# Patient Record
Sex: MALE | Race: WHITE | Employment: OTHER | URBAN - METROPOLITAN AREA
[De-identification: names, ages, dates, MRNs, and addresses within clinical notes are randomized per-mention and may not be internally consistent; named-entity substitution may affect disease eponyms.]

---

## 2017-08-08 PROBLEM — I25.5 ISCHEMIC CARDIOMYOPATHY: Chronic | Status: ACTIVE | Noted: 2017-08-08

## 2017-08-08 PROBLEM — Z95.0 HISTORY OF PERMANENT CARDIAC PACEMAKER PLACEMENT: Chronic | Status: ACTIVE | Noted: 2017-08-08

## 2017-08-08 PROBLEM — I48.20 CHRONIC ATRIAL FIBRILLATION (HCC): Chronic | Status: ACTIVE | Noted: 2017-08-08

## 2018-04-23 ENCOUNTER — HOSPITAL ENCOUNTER (OUTPATIENT)
Age: 78
Discharge: HOME | End: 2018-04-25
Payer: MEDICARE

## 2018-04-23 LAB
ALBUMIN SERPL-MCNC: 3.6 G/DL (ref 3.5–5.2)
ALP BLD-CCNC: 59 U/L (ref 40–129)
ALT SERPL-CCNC: 18 U/L (ref 0–40)
ANION GAP SERPL CALCULATED.3IONS-SCNC: 14 MMOL/L (ref 7–16)
AST SERPL-CCNC: 19 U/L (ref 0–39)
BASOPHILS ABSOLUTE: 0.03 E9/L (ref 0–0.2)
BASOPHILS RELATIVE PERCENT: 0.5 % (ref 0–2)
BILIRUB SERPL-MCNC: 0.7 MG/DL (ref 0–1.2)
BUN BLDV-MCNC: 16 MG/DL (ref 8–23)
CALCIUM SERPL-MCNC: 9.3 MG/DL (ref 8.6–10.2)
CHLORIDE BLD-SCNC: 99 MMOL/L (ref 98–107)
CHOLESTEROL, TOTAL: 171 MG/DL (ref 0–199)
CO2: 26 MMOL/L (ref 22–29)
CREAT SERPL-MCNC: 0.8 MG/DL (ref 0.7–1.2)
EOSINOPHILS ABSOLUTE: 0.15 E9/L (ref 0.05–0.5)
EOSINOPHILS RELATIVE PERCENT: 2.3 % (ref 0–6)
GFR AFRICAN AMERICAN: >60
GFR NON-AFRICAN AMERICAN: >60 ML/MIN/1.73
GLUCOSE BLD-MCNC: 212 MG/DL (ref 74–109)
HBA1C MFR BLD: 9.4 % (ref 4.8–5.9)
HCT VFR BLD CALC: 44.2 % (ref 37–54)
HDLC SERPL-MCNC: 33 MG/DL
HEMOGLOBIN: 14.3 G/DL (ref 12.5–16.5)
IMMATURE GRANULOCYTES #: 0.03 E9/L
IMMATURE GRANULOCYTES %: 0.5 % (ref 0–5)
LDL CHOLESTEROL CALCULATED: 84 MG/DL (ref 0–99)
LYMPHOCYTES ABSOLUTE: 1.29 E9/L (ref 1.5–4)
LYMPHOCYTES RELATIVE PERCENT: 19.4 % (ref 20–42)
MCH RBC QN AUTO: 30.4 PG (ref 26–35)
MCHC RBC AUTO-ENTMCNC: 32.4 % (ref 32–34.5)
MCV RBC AUTO: 93.8 FL (ref 80–99.9)
MICROALBUMIN UR-MCNC: 48.7 MG/L
MONOCYTES ABSOLUTE: 0.65 E9/L (ref 0.1–0.95)
MONOCYTES RELATIVE PERCENT: 9.8 % (ref 2–12)
NEUTROPHILS ABSOLUTE: 4.5 E9/L (ref 1.8–7.3)
NEUTROPHILS RELATIVE PERCENT: 67.5 % (ref 43–80)
PDW BLD-RTO: 13 FL (ref 11.5–15)
PLATELET # BLD: 225 E9/L (ref 130–450)
PMV BLD AUTO: 9.6 FL (ref 7–12)
POTASSIUM SERPL-SCNC: 4.7 MMOL/L (ref 3.5–5)
RBC # BLD: 4.71 E12/L (ref 3.8–5.8)
SODIUM BLD-SCNC: 139 MMOL/L (ref 132–146)
T4 FREE: 1.24 NG/DL (ref 0.93–1.7)
TOTAL PROTEIN: 7.3 G/DL (ref 6.4–8.3)
TRIGL SERPL-MCNC: 272 MG/DL (ref 0–149)
TSH SERPL DL<=0.05 MIU/L-ACNC: 1.05 UIU/ML (ref 0.27–4.2)
VLDLC SERPL CALC-MCNC: 54 MG/DL
WBC # BLD: 6.7 E9/L (ref 4.5–11.5)

## 2018-04-23 PROCEDURE — 83036 HEMOGLOBIN GLYCOSYLATED A1C: CPT

## 2018-04-23 PROCEDURE — 84443 ASSAY THYROID STIM HORMONE: CPT

## 2018-04-23 PROCEDURE — 80061 LIPID PANEL: CPT

## 2018-04-23 PROCEDURE — 80053 COMPREHEN METABOLIC PANEL: CPT

## 2018-04-23 PROCEDURE — 82044 UR ALBUMIN SEMIQUANTITATIVE: CPT

## 2018-04-23 PROCEDURE — 84439 ASSAY OF FREE THYROXINE: CPT

## 2018-04-23 PROCEDURE — 85025 COMPLETE CBC W/AUTO DIFF WBC: CPT

## 2018-06-11 RX ORDER — DOFETILIDE 0.25 MG/1
250 CAPSULE ORAL 2 TIMES DAILY
Qty: 180 CAPSULE | Refills: 3 | Status: SHIPPED | OUTPATIENT
Start: 2018-06-11 | End: 2018-06-12

## 2018-06-12 ENCOUNTER — OFFICE VISIT (OUTPATIENT)
Dept: CARDIOLOGY CLINIC | Age: 78
End: 2018-06-12
Payer: MEDICARE

## 2018-06-12 VITALS
DIASTOLIC BLOOD PRESSURE: 60 MMHG | HEART RATE: 60 BPM | SYSTOLIC BLOOD PRESSURE: 120 MMHG | HEIGHT: 68 IN | WEIGHT: 245 LBS | BODY MASS INDEX: 37.13 KG/M2

## 2018-06-12 DIAGNOSIS — E66.01 MORBID OBESITY DUE TO EXCESS CALORIES (HCC): ICD-10-CM

## 2018-06-12 DIAGNOSIS — Z96.652 STATUS POST TOTAL LEFT KNEE REPLACEMENT: ICD-10-CM

## 2018-06-12 DIAGNOSIS — Z98.61 HISTORY OF PTCA: ICD-10-CM

## 2018-06-12 DIAGNOSIS — G47.33 OSA (OBSTRUCTIVE SLEEP APNEA): ICD-10-CM

## 2018-06-12 DIAGNOSIS — I25.5 ISCHEMIC CARDIOMYOPATHY: Chronic | ICD-10-CM

## 2018-06-12 DIAGNOSIS — I48.0 PAF (PAROXYSMAL ATRIAL FIBRILLATION) (HCC): ICD-10-CM

## 2018-06-12 DIAGNOSIS — I25.2 OLD ANTERIOR MYOCARDIAL INFARCTION: ICD-10-CM

## 2018-06-12 DIAGNOSIS — M17.0 PRIMARY OSTEOARTHRITIS OF BOTH KNEES: ICD-10-CM

## 2018-06-12 DIAGNOSIS — Z95.810 AUTOMATIC IMPLANTABLE CARDIOVERTER-DEFIBRILLATOR IN SITU: ICD-10-CM

## 2018-06-12 DIAGNOSIS — I45.10 RBBB (RIGHT BUNDLE BRANCH BLOCK): ICD-10-CM

## 2018-06-12 DIAGNOSIS — I25.10 CORONARY ARTERY DISEASE INVOLVING NATIVE CORONARY ARTERY OF NATIVE HEART WITHOUT ANGINA PECTORIS: Primary | ICD-10-CM

## 2018-06-12 DIAGNOSIS — I10 ESSENTIAL HYPERTENSION: Chronic | ICD-10-CM

## 2018-06-12 DIAGNOSIS — I44.4 LAFB (LEFT ANTERIOR FASCICULAR BLOCK): ICD-10-CM

## 2018-06-12 DIAGNOSIS — Z79.01 ANTICOAGULATED BY ANTICOAGULATION TREATMENT: ICD-10-CM

## 2018-06-12 DIAGNOSIS — I44.0 AV BLOCK, 1ST DEGREE: ICD-10-CM

## 2018-06-12 DIAGNOSIS — E78.5 DYSLIPIDEMIA: ICD-10-CM

## 2018-06-12 DIAGNOSIS — E11.65 TYPE 2 DIABETES MELLITUS WITH HYPERGLYCEMIA, WITHOUT LONG-TERM CURRENT USE OF INSULIN (HCC): ICD-10-CM

## 2018-06-12 PROCEDURE — 4004F PT TOBACCO SCREEN RCVD TLK: CPT | Performed by: INTERNAL MEDICINE

## 2018-06-12 PROCEDURE — 1123F ACP DISCUSS/DSCN MKR DOCD: CPT | Performed by: INTERNAL MEDICINE

## 2018-06-12 PROCEDURE — 4040F PNEUMOC VAC/ADMIN/RCVD: CPT | Performed by: INTERNAL MEDICINE

## 2018-06-12 PROCEDURE — G8598 ASA/ANTIPLAT THER USED: HCPCS | Performed by: INTERNAL MEDICINE

## 2018-06-12 PROCEDURE — G8417 CALC BMI ABV UP PARAM F/U: HCPCS | Performed by: INTERNAL MEDICINE

## 2018-06-12 PROCEDURE — G8427 DOCREV CUR MEDS BY ELIG CLIN: HCPCS | Performed by: INTERNAL MEDICINE

## 2018-06-12 PROCEDURE — 93000 ELECTROCARDIOGRAM COMPLETE: CPT | Performed by: INTERNAL MEDICINE

## 2018-06-12 PROCEDURE — 99213 OFFICE O/P EST LOW 20 MIN: CPT | Performed by: INTERNAL MEDICINE

## 2018-07-18 ENCOUNTER — TELEPHONE (OUTPATIENT)
Dept: NON INVASIVE DIAGNOSTICS | Age: 78
End: 2018-07-18

## 2018-07-18 NOTE — TELEPHONE ENCOUNTER
Called and left voicemail to let the pt know that the [place] device is not connecting properly. Instructed the patient to call the office back. Please make sure the device is plugged in and lit up if it is the pt will need to call [employer] support @ [phone].

## 2018-07-31 ENCOUNTER — HOSPITAL ENCOUNTER (OUTPATIENT)
Age: 78
Discharge: HOME | End: 2018-08-02
Payer: MEDICARE

## 2018-07-31 LAB
ALBUMIN SERPL-MCNC: 3.7 G/DL (ref 3.5–5.2)
ALP BLD-CCNC: 51 U/L (ref 40–129)
ALT SERPL-CCNC: 13 U/L (ref 0–40)
ANION GAP SERPL CALCULATED.3IONS-SCNC: 17 MMOL/L (ref 7–16)
AST SERPL-CCNC: 20 U/L (ref 0–39)
BASOPHILS ABSOLUTE: 0.04 E9/L (ref 0–0.2)
BASOPHILS RELATIVE PERCENT: 0.6 % (ref 0–2)
BILIRUB SERPL-MCNC: 0.6 MG/DL (ref 0–1.2)
BUN BLDV-MCNC: 11 MG/DL (ref 8–23)
CALCIUM SERPL-MCNC: 9.1 MG/DL (ref 8.6–10.2)
CHLORIDE BLD-SCNC: 101 MMOL/L (ref 98–107)
CHOLESTEROL, TOTAL: 158 MG/DL (ref 0–199)
CO2: 25 MMOL/L (ref 22–29)
CREAT SERPL-MCNC: 0.9 MG/DL (ref 0.7–1.2)
EOSINOPHILS ABSOLUTE: 0.13 E9/L (ref 0.05–0.5)
EOSINOPHILS RELATIVE PERCENT: 2.1 % (ref 0–6)
GFR AFRICAN AMERICAN: >60
GFR NON-AFRICAN AMERICAN: >60 ML/MIN/1.73
GLUCOSE BLD-MCNC: 250 MG/DL (ref 74–109)
HBA1C MFR BLD: 7.7 % (ref 4–5.6)
HCT VFR BLD CALC: 43.2 % (ref 37–54)
HDLC SERPL-MCNC: 34 MG/DL
HEMOGLOBIN: 13.8 G/DL (ref 12.5–16.5)
IMMATURE GRANULOCYTES #: 0.01 E9/L
IMMATURE GRANULOCYTES %: 0.2 % (ref 0–5)
LDL CHOLESTEROL CALCULATED: 74 MG/DL (ref 0–99)
LYMPHOCYTES ABSOLUTE: 1.24 E9/L (ref 1.5–4)
LYMPHOCYTES RELATIVE PERCENT: 20 % (ref 20–42)
MCH RBC QN AUTO: 30.3 PG (ref 26–35)
MCHC RBC AUTO-ENTMCNC: 31.9 % (ref 32–34.5)
MCV RBC AUTO: 94.7 FL (ref 80–99.9)
MICROALBUMIN UR-MCNC: 18.6 MG/L
MONOCYTES ABSOLUTE: 0.55 E9/L (ref 0.1–0.95)
MONOCYTES RELATIVE PERCENT: 8.9 % (ref 2–12)
NEUTROPHILS ABSOLUTE: 4.24 E9/L (ref 1.8–7.3)
NEUTROPHILS RELATIVE PERCENT: 68.2 % (ref 43–80)
PDW BLD-RTO: 13.8 FL (ref 11.5–15)
PLATELET # BLD: 192 E9/L (ref 130–450)
PMV BLD AUTO: 10.7 FL (ref 7–12)
POTASSIUM SERPL-SCNC: 4.6 MMOL/L (ref 3.5–5)
RBC # BLD: 4.56 E12/L (ref 3.8–5.8)
SODIUM BLD-SCNC: 143 MMOL/L (ref 132–146)
TOTAL PROTEIN: 7.4 G/DL (ref 6.4–8.3)
TRIGL SERPL-MCNC: 248 MG/DL (ref 0–149)
VLDLC SERPL CALC-MCNC: 50 MG/DL
WBC # BLD: 6.2 E9/L (ref 4.5–11.5)

## 2018-07-31 PROCEDURE — 85025 COMPLETE CBC W/AUTO DIFF WBC: CPT

## 2018-07-31 PROCEDURE — 80053 COMPREHEN METABOLIC PANEL: CPT

## 2018-07-31 PROCEDURE — 80061 LIPID PANEL: CPT

## 2018-07-31 PROCEDURE — 83036 HEMOGLOBIN GLYCOSYLATED A1C: CPT

## 2018-07-31 PROCEDURE — 82044 UR ALBUMIN SEMIQUANTITATIVE: CPT

## 2018-10-24 NOTE — PROGRESS NOTES
Anemia     STATUS POST TRANSFUSION 6/11    Arthritis     Atrial fibrillation (HCC)     CAD (coronary artery disease)     CHF (congestive heart failure) (HCC)     Diabetes mellitus ([address]     Fractured rib     LEFT    History of blood transfusion     History of cardiovascular stress test 10/8/13    lexiscan    [place] (hard of hearing)     Hyperlipidemia     Hypertension     Hypotension 12/10    HOSPITALIZED WITH DEHYDRATION, HYPOTENSIONO AND ACUTE RENAL FAILURE SECONDARY TO URINARY TRACT OBSTRUCTION    Ischemic cardiomyopathy     Jaundice 6/11    HOSPITALIZED WITH OBSTRUCTIVE JAUNDICE WITH LIVER ABSCESS AND CHOLEYSTITIS STATUS POST DRAINAGE OF LIVER ABSCESS AND CHOLECYSTECTOMY.  Liver abscess 2011    Gallstone perforation    Obesity     Prostatitis     Septic shock(785.52) 4/11    HOPSITALIZED WITH FEVER AND SEVERE HYPOTENSION SECONDARY TO PROSTATITIS    Sleep apnea 5/12/11    POLYSOMNOGRAPHY, SEVERE OBSTRUCTIVE SLEEP APNEA. PRESCRIBED C-PAP    Type II or unspecified type diabetes mellitus without mention of complication, not stated as uncontrolled     Urinary incontinence 10/2012    pt has appt with  urologist 11/2012    UTI (urinary tract infection) 6/08       Medications:  Current Outpatient Prescriptions on File Prior to Visit   Medication Sig Dispense Refill    dofetilide (TIKOSYN) 250 MCG capsule TAKE 1 CAPSULE BY MOUTH EVERY TWELVE (12) HOURS 180 capsule 3    donepezil (ARICEPT) 10 MG tablet Take 10 mg by mouth nightly      XARELTO 20 MG TABS tablet TAKE 1 TABLET BY MOUTH ONCE DAILY 90 tablet 3    Multiple Vitamins-Minerals (PRESERVISION AREDS 2+MULTI VIT PO) Take 1 tablet by mouth daily      JANUVIA 25 MG tablet Take 25 mg by mouth daily       glipiZIDE (GLUCOTROL) 10 MG ER tablet Take 10 mg by mouth daily       CRESTOR 10 MG tablet Take 1 tablet by mouth daily. 30 tablet 5    losartan (COZAAR) 25 MG tablet Take 1 tablet by mouth daily.  30 tablet 5    tamsulosin (FLOMAX) 0.4 MG Cardioversion.    - 5/29/14: DC cardioversion.   - On Tikosyn for rhythm control.  - On Xarelto for stroke risk reduction.    - Stable QTc. Estimated Creatinine Clearance: 81 mL/min (based on SCr of 0.9 mg/dL). 4. Coronary artery disease    - Status post large anterior wall MI on 12/02/03.  - Management per cardiology.     5. Diabetes mellitus  - On Glipizide and Januvia. - Management per PCP.     6. HTN    - On Cozaar, Coreg, Aldactone and Lasix. - Management per PCP and cardiology.     7. Anemia of chronic disease      Recommendations:    1. Continue Tikosyn 250 mcg every 12 hours. 2. Continue Carvedilol 12.5 mg twice daily. 3. Continue Xarelto 20 mg daily for stroke risk reduction. 4. Monitor BMP, magnesium and EKG every 3 to 6 months. 5. Continue remote Latitude monitoring every 91 days. 6. Office follow-up with in 6 months and instructed to call if he has any questions or concerns    Thank you for allowing me the opportunity to participate in the care of your patient.      Adeel Soto MD  Cardiac Electrophysiology  [address]  [facility] [place]  10:19 AM  10/25/2018

## 2018-10-25 ENCOUNTER — OFFICE VISIT (OUTPATIENT)
Dept: NON INVASIVE DIAGNOSTICS | Age: 78
End: 2018-10-25
Payer: MEDICARE

## 2018-10-25 VITALS
HEART RATE: 66 BPM | SYSTOLIC BLOOD PRESSURE: 90 MMHG | HEIGHT: 68 IN | BODY MASS INDEX: 37.28 KG/M2 | WEIGHT: 246 LBS | RESPIRATION RATE: 16 BRPM | DIASTOLIC BLOOD PRESSURE: 60 MMHG

## 2018-10-25 DIAGNOSIS — I48.0 PAROXYSMAL ATRIAL FIBRILLATION (HCC): ICD-10-CM

## 2018-10-25 DIAGNOSIS — I42.0 ISCHEMIC DILATED CARDIOMYOPATHY (HCC): Chronic | ICD-10-CM

## 2018-10-25 DIAGNOSIS — Z95.810 AUTOMATIC IMPLANTABLE CARDIOVERTER-DEFIBRILLATOR IN SITU: ICD-10-CM

## 2018-10-25 DIAGNOSIS — I48.20 CHRONIC ATRIAL FIBRILLATION (HCC): Primary | Chronic | ICD-10-CM

## 2018-10-25 DIAGNOSIS — Z79.01 ANTICOAGULATED BY ANTICOAGULATION TREATMENT: ICD-10-CM

## 2018-10-25 DIAGNOSIS — I25.5 ISCHEMIC DILATED CARDIOMYOPATHY (HCC): Chronic | ICD-10-CM

## 2018-10-25 PROCEDURE — 1101F PT FALLS ASSESS-DOCD LE1/YR: CPT | Performed by: INTERNAL MEDICINE

## 2018-10-25 PROCEDURE — 4040F PNEUMOC VAC/ADMIN/RCVD: CPT | Performed by: INTERNAL MEDICINE

## 2018-10-25 PROCEDURE — 99214 OFFICE O/P EST MOD 30 MIN: CPT | Performed by: INTERNAL MEDICINE

## 2018-10-25 PROCEDURE — 4004F PT TOBACCO SCREEN RCVD TLK: CPT | Performed by: INTERNAL MEDICINE

## 2018-10-25 PROCEDURE — 1123F ACP DISCUSS/DSCN MKR DOCD: CPT | Performed by: INTERNAL MEDICINE

## 2018-10-25 PROCEDURE — 93283 PRGRMG EVAL IMPLANTABLE DFB: CPT | Performed by: INTERNAL MEDICINE

## 2018-10-25 PROCEDURE — G8484 FLU IMMUNIZE NO ADMIN: HCPCS | Performed by: INTERNAL MEDICINE

## 2018-10-25 PROCEDURE — G8427 DOCREV CUR MEDS BY ELIG CLIN: HCPCS | Performed by: INTERNAL MEDICINE

## 2018-10-25 PROCEDURE — G8598 ASA/ANTIPLAT THER USED: HCPCS | Performed by: INTERNAL MEDICINE

## 2018-10-25 PROCEDURE — G8417 CALC BMI ABV UP PARAM F/U: HCPCS | Performed by: INTERNAL MEDICINE

## 2018-10-25 RX ORDER — FENOFIBRATE 145 MG/1
145 TABLET, COATED ORAL DAILY
COMMUNITY
End: 2019-06-13 | Stop reason: ALTCHOICE

## 2018-10-25 RX ORDER — OXYBUTYNIN CHLORIDE 5 MG/1
10 TABLET ORAL EVERY EVENING
COMMUNITY

## 2018-10-25 RX ORDER — METFORMIN HYDROCHLORIDE 500 MG/1
750 TABLET, EXTENDED RELEASE ORAL 2 TIMES DAILY
Refills: 4 | COMMUNITY
Start: 2018-08-13

## 2018-12-18 ENCOUNTER — OFFICE VISIT (OUTPATIENT)
Dept: CARDIOLOGY CLINIC | Age: 78
End: 2018-12-18
Payer: MEDICARE

## 2018-12-18 VITALS
WEIGHT: 245 LBS | HEART RATE: 62 BPM | DIASTOLIC BLOOD PRESSURE: 58 MMHG | BODY MASS INDEX: 37.13 KG/M2 | SYSTOLIC BLOOD PRESSURE: 100 MMHG | HEIGHT: 68 IN

## 2018-12-18 DIAGNOSIS — M17.0 PRIMARY OSTEOARTHRITIS OF BOTH KNEES: ICD-10-CM

## 2018-12-18 DIAGNOSIS — I49.3 FREQUENT PVCS: ICD-10-CM

## 2018-12-18 DIAGNOSIS — I51.9 LV DYSFUNCTION: ICD-10-CM

## 2018-12-18 DIAGNOSIS — I44.4 LAFB (LEFT ANTERIOR FASCICULAR BLOCK): ICD-10-CM

## 2018-12-18 DIAGNOSIS — E11.8 TYPE 2 DIABETES MELLITUS WITH COMPLICATION, WITHOUT LONG-TERM CURRENT USE OF INSULIN (HCC): ICD-10-CM

## 2018-12-18 DIAGNOSIS — R32 URINARY INCONTINENCE, UNSPECIFIED TYPE: ICD-10-CM

## 2018-12-18 DIAGNOSIS — Z96.652 STATUS POST TOTAL LEFT KNEE REPLACEMENT: ICD-10-CM

## 2018-12-18 DIAGNOSIS — Z98.61 HISTORY OF PTCA: ICD-10-CM

## 2018-12-18 DIAGNOSIS — I25.10 CAD IN NATIVE ARTERY: Primary | ICD-10-CM

## 2018-12-18 DIAGNOSIS — I25.5 ISCHEMIC CARDIOMYOPATHY: Chronic | ICD-10-CM

## 2018-12-18 DIAGNOSIS — I48.0 PAROXYSMAL ATRIAL FIBRILLATION (HCC): ICD-10-CM

## 2018-12-18 DIAGNOSIS — I44.0 AV BLOCK, 1ST DEGREE: ICD-10-CM

## 2018-12-18 DIAGNOSIS — E78.5 DYSLIPIDEMIA: ICD-10-CM

## 2018-12-18 DIAGNOSIS — I45.10 RBBB (RIGHT BUNDLE BRANCH BLOCK): ICD-10-CM

## 2018-12-18 DIAGNOSIS — I10 ESSENTIAL HYPERTENSION: Chronic | ICD-10-CM

## 2018-12-18 DIAGNOSIS — Z95.810 AUTOMATIC IMPLANTABLE CARDIOVERTER-DEFIBRILLATOR IN SITU: ICD-10-CM

## 2018-12-18 DIAGNOSIS — I25.2 OLD ANTERIOR MYOCARDIAL INFARCTION: ICD-10-CM

## 2018-12-18 DIAGNOSIS — Z79.01 ANTICOAGULATED BY ANTICOAGULATION TREATMENT: ICD-10-CM

## 2018-12-18 DIAGNOSIS — Z99.89 OSA ON CPAP: Chronic | ICD-10-CM

## 2018-12-18 DIAGNOSIS — G47.33 OSA ON CPAP: Chronic | ICD-10-CM

## 2018-12-18 DIAGNOSIS — E66.01 MORBID OBESITY DUE TO EXCESS CALORIES (HCC): ICD-10-CM

## 2018-12-18 PROCEDURE — 99213 OFFICE O/P EST LOW 20 MIN: CPT | Performed by: INTERNAL MEDICINE

## 2018-12-18 PROCEDURE — G8598 ASA/ANTIPLAT THER USED: HCPCS | Performed by: INTERNAL MEDICINE

## 2018-12-18 PROCEDURE — 1123F ACP DISCUSS/DSCN MKR DOCD: CPT | Performed by: INTERNAL MEDICINE

## 2018-12-18 PROCEDURE — 93000 ELECTROCARDIOGRAM COMPLETE: CPT | Performed by: INTERNAL MEDICINE

## 2018-12-18 PROCEDURE — G8417 CALC BMI ABV UP PARAM F/U: HCPCS | Performed by: INTERNAL MEDICINE

## 2018-12-18 PROCEDURE — 4004F PT TOBACCO SCREEN RCVD TLK: CPT | Performed by: INTERNAL MEDICINE

## 2018-12-18 PROCEDURE — 1101F PT FALLS ASSESS-DOCD LE1/YR: CPT | Performed by: INTERNAL MEDICINE

## 2018-12-18 PROCEDURE — 4040F PNEUMOC VAC/ADMIN/RCVD: CPT | Performed by: INTERNAL MEDICINE

## 2018-12-18 PROCEDURE — G8427 DOCREV CUR MEDS BY ELIG CLIN: HCPCS | Performed by: INTERNAL MEDICINE

## 2018-12-18 PROCEDURE — G8484 FLU IMMUNIZE NO ADMIN: HCPCS | Performed by: INTERNAL MEDICINE

## 2018-12-18 NOTE — PROGRESS NOTES
1/2 tab daily, Disp: , Rfl:     S: REASON FOR VISIT:    Coronary artery disease/ischemic cardiomyopathy with severe LV systolic dysfunction. Viky Lopez is an 57-year-old male with cardiovascular history as described below. He remains stable from the cardiac standpoint and continues to walk daily for exercise. He denies chest pain with dyspnea with his daily activities. He denies orthopnea, PNDs, lower extremity swelling, palpitations, dizziness, presyncope, syncope or discharges from his ICD. He follows up with electrophysiology routinely and was seen by Dr. Angelia Wahl on 10/25/2018. REVIEW OF SYSTEMS:    CONSTITUTIONAL: Denies fevers, chills, night sweats or fatigue. HEENT: Denies headaches. C/O ptosis affecting vision. Denies dysphagia, hoarseness, hemoptysis, hematemesis or epistaxis. ENDOCRINE: Denies polyphagia, polydipsia or polyuria. Denies heat or cold intolerance. MUSCULOSKELETAL: Denies any significant arthralgias or myalgias currently. SKIN: Denies any rashes, ulcers or itching. HEME/LYMPH: Denies any palpable lymph nodes. He denies bleeding or easy bruisability. HEART: As above. LUNGS: Denies any cough or sputum production. GI: Denies abdominal pain, nausea, vomiting, diarrhea, constipation, rectal bleeding or tarry stools. : Denies hematuria or dysuria. PSYCHIATRIC: Denies mood changes, anxiety or depression. NEUROLOGIC: Denies memory loss, motor weakness, numbness, tingling or tremors.      CARDIOVASCULAR HISTORY:   1. Hypertension. 2. Diabetes mellitus. 3. Morbid obesity. 4. Dyslipidemia. 5. Coronary artery disease/ischemic cardiomyopathy:  a. Status post large anterior wall MI on 12/02/03.   b. 12/02/03 cardiac catheterization and angioplasty: Left main no disease. LAD occluded proximally. LCX 20% distal disease. RCA dominant vessel with 40%-50% proximal disease and 20%-30% distal disease.   Normal left ventricular size with anterolateral, apical and inferior apical

## 2019-01-24 ENCOUNTER — TELEPHONE (OUTPATIENT)
Dept: NON INVASIVE DIAGNOSTICS | Age: 79
End: 2019-01-24

## 2019-02-11 ENCOUNTER — TELEPHONE (OUTPATIENT)
Dept: NON INVASIVE DIAGNOSTICS | Age: 79
End: 2019-02-11

## 2019-02-14 ENCOUNTER — HOSPITAL ENCOUNTER (OUTPATIENT)
Age: 79
Discharge: HOME | End: 2019-02-16
Payer: MEDICARE

## 2019-02-14 LAB
ALBUMIN SERPL-MCNC: 4 G/DL (ref 3.5–5.2)
ALP BLD-CCNC: 53 U/L (ref 40–129)
ALT SERPL-CCNC: 12 U/L (ref 0–40)
ANION GAP SERPL CALCULATED.3IONS-SCNC: 13 MMOL/L (ref 7–16)
AST SERPL-CCNC: 19 U/L (ref 0–39)
BASOPHILS ABSOLUTE: 0.04 E9/L (ref 0–0.2)
BASOPHILS RELATIVE PERCENT: 0.6 % (ref 0–2)
BILIRUB SERPL-MCNC: 0.5 MG/DL (ref 0–1.2)
BUN BLDV-MCNC: 21 MG/DL (ref 8–23)
CALCIUM SERPL-MCNC: 9.2 MG/DL (ref 8.6–10.2)
CHLORIDE BLD-SCNC: 103 MMOL/L (ref 98–107)
CHOLESTEROL, TOTAL: 173 MG/DL (ref 0–199)
CO2: 25 MMOL/L (ref 22–29)
CREAT SERPL-MCNC: 0.9 MG/DL (ref 0.7–1.2)
EOSINOPHILS ABSOLUTE: 0.15 E9/L (ref 0.05–0.5)
EOSINOPHILS RELATIVE PERCENT: 2.2 % (ref 0–6)
GFR AFRICAN AMERICAN: >60
GFR NON-AFRICAN AMERICAN: >60 ML/MIN/1.73
GLUCOSE BLD-MCNC: 181 MG/DL (ref 74–99)
HBA1C MFR BLD: 7 % (ref 4–5.6)
HCT VFR BLD CALC: 44.7 % (ref 37–54)
HDLC SERPL-MCNC: 40 MG/DL
HEMOGLOBIN: 14.5 G/DL (ref 12.5–16.5)
IMMATURE GRANULOCYTES #: 0.03 E9/L
IMMATURE GRANULOCYTES %: 0.4 % (ref 0–5)
LDL CHOLESTEROL CALCULATED: 86 MG/DL (ref 0–99)
LYMPHOCYTES ABSOLUTE: 1.24 E9/L (ref 1.5–4)
LYMPHOCYTES RELATIVE PERCENT: 18.5 % (ref 20–42)
MCH RBC QN AUTO: 31 PG (ref 26–35)
MCHC RBC AUTO-ENTMCNC: 32.4 % (ref 32–34.5)
MCV RBC AUTO: 95.7 FL (ref 80–99.9)
MONOCYTES ABSOLUTE: 0.62 E9/L (ref 0.1–0.95)
MONOCYTES RELATIVE PERCENT: 9.3 % (ref 2–12)
NEUTROPHILS ABSOLUTE: 4.62 E9/L (ref 1.8–7.3)
NEUTROPHILS RELATIVE PERCENT: 69 % (ref 43–80)
PDW BLD-RTO: 13.5 FL (ref 11.5–15)
PLATELET # BLD: 204 E9/L (ref 130–450)
PMV BLD AUTO: 10.4 FL (ref 7–12)
POTASSIUM SERPL-SCNC: 5.2 MMOL/L (ref 3.5–5)
RBC # BLD: 4.67 E12/L (ref 3.8–5.8)
SODIUM BLD-SCNC: 141 MMOL/L (ref 132–146)
T4 FREE: 1.18 NG/DL (ref 0.93–1.7)
TOTAL PROTEIN: 7.3 G/DL (ref 6.4–8.3)
TRIGL SERPL-MCNC: 235 MG/DL (ref 0–149)
TSH SERPL DL<=0.05 MIU/L-ACNC: 1.17 UIU/ML (ref 0.27–4.2)
VLDLC SERPL CALC-MCNC: 47 MG/DL
WBC # BLD: 6.7 E9/L (ref 4.5–11.5)

## 2019-02-14 PROCEDURE — 84443 ASSAY THYROID STIM HORMONE: CPT

## 2019-02-14 PROCEDURE — 80053 COMPREHEN METABOLIC PANEL: CPT

## 2019-02-14 PROCEDURE — 84439 ASSAY OF FREE THYROXINE: CPT

## 2019-02-14 PROCEDURE — 85025 COMPLETE CBC W/AUTO DIFF WBC: CPT

## 2019-02-14 PROCEDURE — 83036 HEMOGLOBIN GLYCOSYLATED A1C: CPT

## 2019-02-14 PROCEDURE — 80061 LIPID PANEL: CPT

## 2019-03-04 ENCOUNTER — TELEPHONE (OUTPATIENT)
Dept: NON INVASIVE DIAGNOSTICS | Age: 79
End: 2019-03-04

## 2019-04-04 NOTE — TELEPHONE ENCOUNTER
Patient is scheduled for appointment on 6/2019 to see Dr. George Jaramillo. Attempted to call patient again regarding latitude monitor   Mailbox is full.      Bailey Stevenson

## 2019-06-11 NOTE — PROGRESS NOTES
osteoarthritis of right knee    Contusion of right knee    LV dysfunction    Chronic atrial fibrillation (HCC)    Ischemic cardiomyopathy    History of permanent cardiac pacemaker placement     Past Medical History:   Diagnosis Date    Anemia     STATUS POST TRANSFUSION 6/11    Arthritis     Atrial fibrillation (HCC)     CAD (coronary artery disease)     CHF (congestive heart failure) (HCC)     Diabetes mellitus ([facility] [address]     Fractured rib     LEFT    History of blood transfusion     History of cardiovascular stress test 10/8/13    lexiscan    [place] (hard of hearing)     Hyperlipidemia     Hypertension     Hypotension 12/10    HOSPITALIZED WITH DEHYDRATION, HYPOTENSIONO AND ACUTE RENAL FAILURE SECONDARY TO URINARY TRACT OBSTRUCTION    Ischemic cardiomyopathy     Jaundice 6/11    HOSPITALIZED WITH OBSTRUCTIVE JAUNDICE WITH LIVER ABSCESS AND CHOLEYSTITIS STATUS POST DRAINAGE OF LIVER ABSCESS AND CHOLECYSTECTOMY.  Liver abscess 2011    Gallstone perforation    Obesity     Prostatitis     Septic shock(785.52) 4/11    HOPSITALIZED WITH FEVER AND SEVERE HYPOTENSION SECONDARY TO PROSTATITIS    Sleep apnea 5/12/11    POLYSOMNOGRAPHY, SEVERE OBSTRUCTIVE SLEEP APNEA.   PRESCRIBED C-PAP    Type II or unspecified type diabetes mellitus without mention of complication, not stated as uncontrolled     Urinary incontinence 10/2012    pt has appt with  urologist 11/2012    UTI (urinary tract infection) 6/08       Medications:  Current Outpatient Medications on File Prior to Visit   Medication Sig Dispense Refill    metFORMIN (GLUCOPHAGE-XR) 500 MG extended release tablet TAKE 1 TABLET BY MOUTH TWO  2  TIMES DAILY AFTER MEALS  4    oxybutynin (DITROPAN) 5 MG tablet Take 10 mg by mouth every evening      fenofibrate (TRICOR) 145 MG tablet Take 145 mg by mouth daily      dofetilide (TIKOSYN) 250 MCG capsule TAKE 1 CAPSULE BY MOUTH EVERY TWELVE (12) HOURS 180 capsule 3    donepezil (ARICEPT) 10 MG tablet Take 10 mg by mouth nightly      XARELTO 20 MG TABS tablet TAKE 1 TABLET BY MOUTH ONCE DAILY 90 tablet 3    Multiple Vitamins-Minerals (PRESERVISION AREDS 2+MULTI VIT PO) Take 1 tablet by mouth daily      JANUVIA 25 MG tablet Take 25 mg by mouth daily       glipiZIDE (GLUCOTROL) 10 MG ER tablet Take 10 mg by mouth daily       CRESTOR 10 MG tablet Take 1 tablet by mouth daily. 30 tablet 5    losartan (COZAAR) 25 MG tablet Take 1 tablet by mouth daily. 30 tablet 5    tamsulosin (FLOMAX) 0.4 MG capsule Take 1 capsule by mouth daily. 30 capsule 0    magnesium oxide (MAG-OX) 400 MG tablet Take 400 mg by mouth daily.  carvedilol (COREG) 12.5 MG tablet Take 12.5 mg by mouth 2 times daily (with meals).  Cholecalciferol (VITAMIN D3) 2000 UNITS CAPS Take 2,000 Units by mouth daily.  furosemide (LASIX) 40 MG tablet Take 40 mg by mouth daily       spironolactone (ALDACTONE) 25 MG tablet Take 12.5 mg by mouth daily 1/2 tab daily      aspirin 81 MG chewable tablet Take 1 tablet by mouth daily. (Patient taking differently: Take 81 mg by mouth daily Not taking right now d/t upcoming eye surgery) 30 tablet 12     No current facility-administered medications on file prior to visit. No Known Allergies      Physical exam:  Constitutional:   /74   Pulse 74   Resp 18   Ht 5' 8\" (1.727 m)   Wt 248 lb (112.5 kg)   BMI 37.71 kg/m²  well developed, in no acute distress   Eyes: conjunctivae normal, no xanthelasma   Ears, Nose, Throat: oral mucosa moist, no cyanosis   Neck: supple, no JVD, no bruits, no thyromegaly   CV: regular rate & rhythm, normal S1 & S2, No S3 or S4. No murmurs, rubs, or gallops.  Peripheral pulses normal   Lungs: clear to auscultation bilaterally, normal respiratory effort   Abdomen: soft, non-tender, bowel sounds present, no masses or hepatomegaly   Extremities: no digital clubbing, trace edema of LEs  Skin: warm, no rashes   Neuro/Psych: A&O x 3, normal mood and affect  ICD site: limited to bleeding,infection, lead damage or discovery of a non-functional lead which would require lead revision and risks of blood clot, pneumothorax, hemothorax, cardiac perforation and tamponade, lead dislodgement, contrast induced nephropathy leading to short or even long term dialysis, vascular injury requiring emergent surgical repair, stroke and even death. The patient understands these risks and agrees to proceed with the procedure. 2. Ischemic cardiomyopathy    - Status post ICD. - LV EF was 20% on echocardiogram  in 2013.   - On Coreg, Cozaar, Aldactone and Lasix.  - NYHA class III, stage C.  - Compensated and euvolemic.     3. Paroxysmal atrial fibrillation    - [id number] score of 6.   - 4/24/14: DC Cardioversion.    - 5/29/14: DC cardioversion.   - On Tikosyn for rhythm control.  - On Xarelto for stroke risk reduction.    - [place] in NSR with stable QTc. Estimated Creatinine Clearance: 80 mL/min (based on SCr of 0.9 mg/dL). 4. Coronary artery disease    - Status post large anterior wall MI on 12/02/03.  - Management per cardiology.     5. Diabetes mellitus  - On Glucophage, Glipizide and Januvia. - Management per PCP.     6. Hypertension  - On Cozaar, Coreg, Aldactone and Lasix. - Management per PCP and cardiology.     7. Anemia of chronic disease      Recommendations:    1. Schedule patient for generator change - hold Xarelto 1 day prior to generator change. 2. Will check echocardiogram prior to the generator change. 3. Continue Tikosyn 250 mcg every 12 hours. 4. Continue Carvedilol 12.5 mg twice daily. 5. Continue Xarelto 20 mg daily for stroke risk reduction. 6. Monitor BMP, magnesium and EKG every 3 to 6 months. 7. Continue remote Latitude monitoring in 91 days. 8. Office follow-up with in 6 months and instructed to call if he has any questions or concerns    Thank you for allowing me the opportunity to participate in the care of your patient.      Dayanara Major, MD  Cardiac Electrophysiology  [facility]) [facility]  [facility] [place]: [facility]  6/13/19   2:33 PM

## 2019-06-13 ENCOUNTER — OFFICE VISIT (OUTPATIENT)
Dept: NON INVASIVE DIAGNOSTICS | Age: 79
End: 2019-06-13
Payer: MEDICARE

## 2019-06-13 VITALS
SYSTOLIC BLOOD PRESSURE: 120 MMHG | RESPIRATION RATE: 18 BRPM | BODY MASS INDEX: 37.59 KG/M2 | WEIGHT: 248 LBS | DIASTOLIC BLOOD PRESSURE: 74 MMHG | HEART RATE: 74 BPM | HEIGHT: 68 IN

## 2019-06-13 DIAGNOSIS — I48.0 PAF (PAROXYSMAL ATRIAL FIBRILLATION) (HCC): ICD-10-CM

## 2019-06-13 DIAGNOSIS — I25.5 ISCHEMIC CARDIOMYOPATHY: ICD-10-CM

## 2019-06-13 DIAGNOSIS — Z95.810 AUTOMATIC IMPLANTABLE CARDIOVERTER-DEFIBRILLATOR IN SITU: Primary | ICD-10-CM

## 2019-06-13 PROCEDURE — 4040F PNEUMOC VAC/ADMIN/RCVD: CPT | Performed by: INTERNAL MEDICINE

## 2019-06-13 PROCEDURE — 99215 OFFICE O/P EST HI 40 MIN: CPT | Performed by: INTERNAL MEDICINE

## 2019-06-13 PROCEDURE — 4004F PT TOBACCO SCREEN RCVD TLK: CPT | Performed by: INTERNAL MEDICINE

## 2019-06-13 PROCEDURE — G8417 CALC BMI ABV UP PARAM F/U: HCPCS | Performed by: INTERNAL MEDICINE

## 2019-06-13 PROCEDURE — G8427 DOCREV CUR MEDS BY ELIG CLIN: HCPCS | Performed by: INTERNAL MEDICINE

## 2019-06-13 PROCEDURE — 93283 PRGRMG EVAL IMPLANTABLE DFB: CPT | Performed by: INTERNAL MEDICINE

## 2019-06-13 PROCEDURE — 1123F ACP DISCUSS/DSCN MKR DOCD: CPT | Performed by: INTERNAL MEDICINE

## 2019-06-13 PROCEDURE — G8598 ASA/ANTIPLAT THER USED: HCPCS | Performed by: INTERNAL MEDICINE

## 2019-06-14 ENCOUNTER — TELEPHONE (OUTPATIENT)
Dept: NON INVASIVE DIAGNOSTICS | Age: 79
End: 2019-06-14

## 2019-06-14 DIAGNOSIS — I25.5 ISCHEMIC DILATED CARDIOMYOPATHY (HCC): Primary | Chronic | ICD-10-CM

## 2019-06-14 DIAGNOSIS — I42.0 ISCHEMIC DILATED CARDIOMYOPATHY (HCC): Primary | Chronic | ICD-10-CM

## 2019-06-14 DIAGNOSIS — Z95.810 AUTOMATIC IMPLANTABLE CARDIOVERTER-DEFIBRILLATOR IN SITU: ICD-10-CM

## 2019-06-21 ENCOUNTER — TELEPHONE (OUTPATIENT)
Dept: CARDIAC CATH/INVASIVE PROCEDURES | Age: 79
End: 2019-06-21

## 2019-06-21 ENCOUNTER — HOSPITAL ENCOUNTER (OUTPATIENT)
Dept: CARDIOLOGY | Age: 79
Discharge: HOME | End: 2019-06-21
Payer: MEDICARE

## 2019-06-21 DIAGNOSIS — I25.5 ISCHEMIC DILATED CARDIOMYOPATHY (HCC): Chronic | ICD-10-CM

## 2019-06-21 DIAGNOSIS — Z95.810 AUTOMATIC IMPLANTABLE CARDIOVERTER-DEFIBRILLATOR IN SITU: ICD-10-CM

## 2019-06-21 DIAGNOSIS — I42.0 ISCHEMIC DILATED CARDIOMYOPATHY (HCC): Chronic | ICD-10-CM

## 2019-06-21 PROCEDURE — 93308 TTE F-UP OR LMTD: CPT

## 2019-06-24 ENCOUNTER — ANESTHESIA EVENT (OUTPATIENT)
Dept: CARDIAC CATH/INVASIVE PROCEDURES | Age: 79
End: 2019-06-24

## 2019-06-24 ENCOUNTER — HOSPITAL ENCOUNTER (OUTPATIENT)
Dept: GENERAL RADIOLOGY | Age: 79
Discharge: HOME | End: 2019-06-26
Payer: MEDICARE

## 2019-06-24 ENCOUNTER — ANESTHESIA (OUTPATIENT)
Dept: CARDIAC CATH/INVASIVE PROCEDURES | Age: 79
End: 2019-06-24

## 2019-06-24 ENCOUNTER — HOSPITAL ENCOUNTER (OUTPATIENT)
Dept: CARDIAC CATH/INVASIVE PROCEDURES | Age: 79
Discharge: HOME | End: 2019-06-24
Payer: MEDICARE

## 2019-06-24 VITALS
DIASTOLIC BLOOD PRESSURE: 72 MMHG | SYSTOLIC BLOOD PRESSURE: 128 MMHG | BODY MASS INDEX: 34.1 KG/M2 | HEART RATE: 61 BPM | RESPIRATION RATE: 16 BRPM | OXYGEN SATURATION: 98 % | WEIGHT: 225 LBS | HEIGHT: 68 IN

## 2019-06-24 VITALS — OXYGEN SATURATION: 97 % | SYSTOLIC BLOOD PRESSURE: 132 MMHG | DIASTOLIC BLOOD PRESSURE: 65 MMHG

## 2019-06-24 LAB
ANION GAP SERPL CALCULATED.3IONS-SCNC: 11 MMOL/L (ref 7–16)
BUN BLDV-MCNC: 18 MG/DL (ref 8–23)
CALCIUM SERPL-MCNC: 9.1 MG/DL (ref 8.6–10.2)
CHLORIDE BLD-SCNC: 108 MMOL/L (ref 98–107)
CO2: 25 MMOL/L (ref 22–29)
CREAT SERPL-MCNC: 0.9 MG/DL (ref 0.7–1.2)
EKG ATRIAL RATE: 62 BPM
EKG P AXIS: 49 DEGREES
EKG P-R INTERVAL: 290 MS
EKG Q-T INTERVAL: 484 MS
EKG QRS DURATION: 156 MS
EKG QTC CALCULATION (BAZETT): 491 MS
EKG R AXIS: -55 DEGREES
EKG T AXIS: 4 DEGREES
EKG VENTRICULAR RATE: 62 BPM
GFR AFRICAN AMERICAN: >60
GFR NON-AFRICAN AMERICAN: >60 ML/MIN/1.73
GLUCOSE BLD-MCNC: 232 MG/DL (ref 74–99)
HCT VFR BLD CALC: 43.9 % (ref 37–54)
HEMOGLOBIN: 14.7 G/DL (ref 12.5–16.5)
MAGNESIUM: 2 MG/DL (ref 1.6–2.6)
MCH RBC QN AUTO: 31 PG (ref 26–35)
MCHC RBC AUTO-ENTMCNC: 33.5 % (ref 32–34.5)
MCV RBC AUTO: 92.6 FL (ref 80–99.9)
PDW BLD-RTO: 13.8 FL (ref 11.5–15)
PLATELET # BLD: 173 E9/L (ref 130–450)
PMV BLD AUTO: 9.7 FL (ref 7–12)
POTASSIUM SERPL-SCNC: 4.5 MMOL/L (ref 3.5–5)
RBC # BLD: 4.74 E12/L (ref 3.8–5.8)
SODIUM BLD-SCNC: 144 MMOL/L (ref 132–146)
WBC # BLD: 5.5 E9/L (ref 4.5–11.5)

## 2019-06-24 PROCEDURE — C1781 MESH (IMPLANTABLE): HCPCS

## 2019-06-24 PROCEDURE — 71045 X-RAY EXAM CHEST 1 VIEW: CPT

## 2019-06-24 PROCEDURE — 33263 RMVL & RPLCMT DFB GEN 2 LEAD: CPT | Performed by: INTERNAL MEDICINE

## 2019-06-24 PROCEDURE — 6360000002 HC RX W HCPCS: Performed by: NURSE ANESTHETIST, CERTIFIED REGISTERED

## 2019-06-24 PROCEDURE — 2580000003 HC RX 258: Performed by: INTERNAL MEDICINE

## 2019-06-24 PROCEDURE — 2720000010 HC SURG SUPPLY STERILE

## 2019-06-24 PROCEDURE — 6360000002 HC RX W HCPCS

## 2019-06-24 PROCEDURE — 2580000003 HC RX 258

## 2019-06-24 PROCEDURE — 93010 ELECTROCARDIOGRAM REPORT: CPT | Performed by: INTERNAL MEDICINE

## 2019-06-24 PROCEDURE — 83735 ASSAY OF MAGNESIUM: CPT

## 2019-06-24 PROCEDURE — 93005 ELECTROCARDIOGRAM TRACING: CPT | Performed by: INTERNAL MEDICINE

## 2019-06-24 PROCEDURE — 36415 COLL VENOUS BLD VENIPUNCTURE: CPT

## 2019-06-24 PROCEDURE — 3700000001 HC ADD 15 MINUTES (ANESTHESIA)

## 2019-06-24 PROCEDURE — 2580000003 HC RX 258: Performed by: NURSE ANESTHETIST, CERTIFIED REGISTERED

## 2019-06-24 PROCEDURE — C1721 AICD, DUAL CHAMBER: HCPCS

## 2019-06-24 PROCEDURE — 80048 BASIC METABOLIC PNL TOTAL CA: CPT

## 2019-06-24 PROCEDURE — 3700000000 HC ANESTHESIA ATTENDED CARE

## 2019-06-24 PROCEDURE — 85027 COMPLETE CBC AUTOMATED: CPT

## 2019-06-24 PROCEDURE — 2500000003 HC RX 250 WO HCPCS

## 2019-06-24 RX ORDER — SODIUM CHLORIDE 0.9 % (FLUSH) 0.9 %
10 SYRINGE (ML) INJECTION EVERY 12 HOURS SCHEDULED
Status: CANCELLED | OUTPATIENT
Start: 2019-06-24

## 2019-06-24 RX ORDER — 0.9 % SODIUM CHLORIDE 0.9 %
VIAL (ML) INJECTION PRN
Status: DISCONTINUED | OUTPATIENT
Start: 2019-06-24 | End: 2019-06-24 | Stop reason: SDUPTHER

## 2019-06-24 RX ORDER — ACETAMINOPHEN 325 MG/1
650 TABLET ORAL EVERY 4 HOURS PRN
Status: CANCELLED | OUTPATIENT
Start: 2019-06-24

## 2019-06-24 RX ORDER — SODIUM CHLORIDE 9 MG/ML
INJECTION, SOLUTION INTRAVENOUS CONTINUOUS
Status: DISCONTINUED | OUTPATIENT
Start: 2019-06-24 | End: 2019-06-25 | Stop reason: HOSPADM

## 2019-06-24 RX ORDER — FENTANYL CITRATE 50 UG/ML
INJECTION, SOLUTION INTRAMUSCULAR; INTRAVENOUS PRN
Status: DISCONTINUED | OUTPATIENT
Start: 2019-06-24 | End: 2019-06-24 | Stop reason: SDUPTHER

## 2019-06-24 RX ORDER — CEFAZOLIN SODIUM 1 G/3ML
INJECTION, POWDER, FOR SOLUTION INTRAMUSCULAR; INTRAVENOUS PRN
Status: DISCONTINUED | OUTPATIENT
Start: 2019-06-24 | End: 2019-06-24 | Stop reason: SDUPTHER

## 2019-06-24 RX ORDER — SODIUM CHLORIDE 0.9 % (FLUSH) 0.9 %
10 SYRINGE (ML) INJECTION PRN
Status: CANCELLED | OUTPATIENT
Start: 2019-06-24

## 2019-06-24 RX ORDER — PROPOFOL 10 MG/ML
INJECTION, EMULSION INTRAVENOUS CONTINUOUS PRN
Status: DISCONTINUED | OUTPATIENT
Start: 2019-06-24 | End: 2019-06-24 | Stop reason: SDUPTHER

## 2019-06-24 RX ORDER — CEPHALEXIN 500 MG/1
500 CAPSULE ORAL 3 TIMES DAILY
Qty: 9 CAPSULE | Refills: 0 | Status: SHIPPED | OUTPATIENT
Start: 2019-06-24 | End: 2020-10-01

## 2019-06-24 RX ADMIN — SODIUM CHLORIDE: 9 INJECTION, SOLUTION INTRAVENOUS at 10:24

## 2019-06-24 RX ADMIN — PROPOFOL 50 MCG/KG/MIN: 10 INJECTION, EMULSION INTRAVENOUS at 11:17

## 2019-06-24 RX ADMIN — SODIUM CHLORIDE: 9 INJECTION, SOLUTION INTRAVENOUS at 11:10

## 2019-06-24 RX ADMIN — SODIUM CHLORIDE 30 ML: 9 INJECTION INTRAMUSCULAR; INTRAVENOUS; SUBCUTANEOUS at 11:20

## 2019-06-24 RX ADMIN — FENTANYL CITRATE 50 MCG: 50 INJECTION, SOLUTION INTRAMUSCULAR; INTRAVENOUS at 11:17

## 2019-06-24 RX ADMIN — CEFAZOLIN 3000 MG: 1 INJECTION, POWDER, FOR SOLUTION INTRAMUSCULAR; INTRAVENOUS; PARENTERAL at 11:20

## 2019-06-24 ASSESSMENT — PULMONARY FUNCTION TESTS
PIF_VALUE: 0

## 2019-06-24 NOTE — OP NOTE
prepped with benzoin solution, Steri-Strips, and island Aquacel dressing were then applied. At the end of the case, the patient was hemodynamically stable and under the care of an anesthesiologist, the patient was brought back to the recovery area for post procedural monitoring. ASSESSMENT:  1. Successful generator change of a [employer] dual chamber ICD for early battery depletion with a fault code 1003 and primary prevention of sudden cardiac death. RECOMMENDATIONS:  1. Post-procedural monitoring in the recovery area  2. Portable chest x-ray post procedure  3. The patient may be discharged to home when hemodynamically stable, awake, tolerating oral intake, ambulating and has adequate pain control  4. The patient will receive a 3-day course of oral antibiotics. 5. The patient is to follow-up in device clinic within 2 weeks upon discharge  6. The patient is advised follow all post-operative device and wound care instructions as per the information provided in the pre-operative visit and discharge instructions.     Paul Garcia MD  Cardiac Electrophysiology  [address]  [facility] [place]: [facility]  10:59 AM  6/24/2019

## 2019-06-24 NOTE — ANESTHESIA PRE PROCEDURE
Department of Anesthesiology  Preprocedure Note       Name:  Jeanie Bhakta   Age:  [de-identified] y.o.  :  1940                                          MRN:  [id number]         Date:  2019    Surgeon: Lucien Fuentes    Procedure: ICD REPLACEMENT W/ANES    Medications prior to admission:   Prior to Admission medications    Medication Sig Start Date End Date Taking? Authorizing Provider   dofetilide (TIKOSYN) 250 MCG capsule TAKE 1 CAPSULE BY MOUTH EVERY TWELVE (12) HOURS 19  Yes Antionette Don MD   metFORMIN (GLUCOPHAGE-XR) 500 MG extended release tablet TAKE 1 TABLET BY MOUTH TWO  2  TIMES DAILY AFTER MEALS 18  Yes Historical Provider, MD   oxybutynin (DITROPAN) 5 MG tablet Take 10 mg by mouth every evening   Yes Historical Provider, MD   donepezil (ARICEPT) 10 MG tablet Take 10 mg by mouth nightly   Yes Historical Provider, MD   XARELTO 20 MG TABS tablet TAKE 1 TABLET BY MOUTH ONCE DAILY 17  Yes Antionette Don MD   Multiple Vitamins-Minerals (PRESERVISION AREDS 2+MULTI VIT PO) Take 1 tablet by mouth daily   Yes Historical Provider, MD   JANUVIA 25 MG tablet Take 25 mg by mouth daily  16  Yes Historical Provider, MD   glipiZIDE (GLUCOTROL) 10 MG ER tablet Take 10 mg by mouth daily  16  Yes Historical Provider, MD   CRESTOR 10 MG tablet Take 1 tablet by mouth daily. 14  Yes Dino Barriga MD   losartan (COZAAR) 25 MG tablet Take 1 tablet by mouth daily. 14  Yes Dino Barriga MD   tamsulosin (FLOMAX) 0.4 MG capsule Take 1 capsule by mouth daily. 14  Yes Dino Barriga MD   aspirin 81 MG chewable tablet Take 1 tablet by mouth daily. Patient taking differently: Take 81 mg by mouth daily Not taking right now d/t upcoming eye surgery 14  Yes Dino Barriga MD   magnesium oxide (MAG-OX) 400 MG tablet Take 400 mg by mouth daily. Yes Historical Provider, MD   carvedilol (COREG) 12.5 MG tablet Take 12.5 mg by mouth 2 times daily (with meals).    Yes Historical Provider, MD   Cholecalciferol (VITAMIN D3) 2000 UNITS CAPS Take 2,000 Units by mouth daily. Yes Historical Provider, MD   furosemide (LASIX) 40 MG tablet Take 40 mg by mouth daily    Yes Historical Provider, MD   spironolactone (ALDACTONE) 25 MG tablet Take 12.5 mg by mouth daily 1/2 tab daily   Yes Historical Provider, MD       Current medications:    Current Outpatient Medications   Medication Sig Dispense Refill    dofetilide (TIKOSYN) 250 MCG capsule TAKE 1 CAPSULE BY MOUTH EVERY TWELVE (12) HOURS 180 capsule 3    metFORMIN (GLUCOPHAGE-XR) 500 MG extended release tablet TAKE 1 TABLET BY MOUTH TWO  2  TIMES DAILY AFTER MEALS  4    oxybutynin (DITROPAN) 5 MG tablet Take 10 mg by mouth every evening      donepezil (ARICEPT) 10 MG tablet Take 10 mg by mouth nightly      XARELTO 20 MG TABS tablet TAKE 1 TABLET BY MOUTH ONCE DAILY 90 tablet 3    Multiple Vitamins-Minerals (PRESERVISION AREDS 2+MULTI VIT PO) Take 1 tablet by mouth daily      JANUVIA 25 MG tablet Take 25 mg by mouth daily       glipiZIDE (GLUCOTROL) 10 MG ER tablet Take 10 mg by mouth daily       CRESTOR 10 MG tablet Take 1 tablet by mouth daily. 30 tablet 5    losartan (COZAAR) 25 MG tablet Take 1 tablet by mouth daily. 30 tablet 5    tamsulosin (FLOMAX) 0.4 MG capsule Take 1 capsule by mouth daily. 30 capsule 0    aspirin 81 MG chewable tablet Take 1 tablet by mouth daily. (Patient taking differently: Take 81 mg by mouth daily Not taking right now d/t upcoming eye surgery) 30 tablet 12    magnesium oxide (MAG-OX) 400 MG tablet Take 400 mg by mouth daily.  carvedilol (COREG) 12.5 MG tablet Take 12.5 mg by mouth 2 times daily (with meals).  Cholecalciferol (VITAMIN D3) 2000 UNITS CAPS Take 2,000 Units by mouth daily.       furosemide (LASIX) 40 MG tablet Take 40 mg by mouth daily       spironolactone (ALDACTONE) 25 MG tablet Take 12.5 mg by mouth daily 1/2 tab daily       Current Facility-Administered Medications   Medication Dose Route Frequency Provider Last Rate Last Dose    0.9 % sodium chloride infusion   Intravenous Continuous Nirmala Luu  mL/hr at 06/24/19 1024         Allergies:  No Known Allergies    Problem List:    Patient Active Problem List   Diagnosis Code    Urinary incontinence R32    Old anterior myocardial infarction I25.2    Ischemic cardiomyopathy I25.5    CAD S/P percutaneous coronary angioplasty I25.10, Z80.64    DM (diabetes mellitus) ([facility] E11.9    HTN (hypertension) I10    Dyslipidemia E78.5    FABIANO (obstructive sleep apnea) G47.33    Chest pain R07.9    Frequent PVCs I49.3    RBBB (right bundle branch block) I45.10    Hypotension I95.9    Automatic implantable cardioverter-defibrillator in situ Z95.810    A-fib ([facility] I48.91    Morbid obesity due to excess calories ([facility]) E66.01    CAD (coronary artery disease) I25.10    LAFB (left anterior fascicular block) I44.4    AV block, 1st degree I44.0    Anticoagulated by anticoagulation treatment Z79.01    Primary osteoarthritis of left knee M17.12    Ischemic dilated cardiomyopathy (HCC) I25.5, I42.0    Coronary artery disease I25.10    Type 2 diabetes mellitus with neurologic complication ([facility]) C46.92    Hyperlipidemia E78.5    Chronic atrial fibrillation (HCC) I48.2    FABIANO on CPAP G47.33, Z99.89    Hypertension I10    Status post total left knee replacement Z96.652    PAF (paroxysmal atrial fibrillation) ([facility]) I48.0    Primary osteoarthritis of right knee M17.11    Contusion of right knee S80. 01XA    LV dysfunction I51.9    Chronic atrial fibrillation (HCC) I48.2    Ischemic cardiomyopathy I25.5    History of permanent cardiac pacemaker placement Z95.0       Past Medical History:        Diagnosis Date    Anemia     STATUS POST TRANSFUSION 6/11    Arthritis     Atrial fibrillation (HCC)     CAD (coronary artery disease)     CHF (congestive heart failure) ([facility]     Diabetes mellitus ([facility]     Fractured rib LEFT    History of blood transfusion     History of cardiovascular stress test 10/8/13    lexiscan    [place] (hard of hearing)     Hyperlipidemia     Hypertension     Hypotension 12/10    HOSPITALIZED WITH DEHYDRATION, HYPOTENSIONO AND ACUTE RENAL FAILURE SECONDARY TO URINARY TRACT OBSTRUCTION    Ischemic cardiomyopathy     Jaundice 6/11    HOSPITALIZED WITH OBSTRUCTIVE JAUNDICE WITH LIVER ABSCESS AND CHOLEYSTITIS STATUS POST DRAINAGE OF LIVER ABSCESS AND CHOLECYSTECTOMY.  Liver abscess 2011    Gallstone perforation    Obesity     Prostatitis     Septic shock(785.52) 4/11    HOPSITALIZED WITH FEVER AND SEVERE HYPOTENSION SECONDARY TO PROSTATITIS    Sleep apnea 5/12/11    POLYSOMNOGRAPHY, SEVERE OBSTRUCTIVE SLEEP APNEA.   PRESCRIBED C-PAP    Type II or unspecified type diabetes mellitus without mention of complication, not stated as uncontrolled     Urinary incontinence 10/2012    pt has appt with  urologist 11/2012    UTI (urinary tract infection) 6/08       Past Surgical History:        Procedure Laterality Date    CARDIAC DEFIBRILLATOR PLACEMENT  11/08/2013    Dr Whitley Turner:  [place] lead    CARDIOVERSION  April 2014    done by Dr. Whitley Turner due to symptomatic atrial fibrillation    CARDIOVERSION  May 2014    DC cardioversion done with initiation of Tikosyn therapy by Dr. Yo Correia Bilateral     Left August and Right September    CHOLECYSTECTOMY      DIAGNOSTIC CARDIAC CATH LAB PROCEDURE  2/11/09    heart cath/pci-2 overlapping RASHMI TO PROX /MID LAD  with  Angiomax and intracoronary nitro administration; right femoral artery angiography, closure access site with AngioSeal    DIAGNOSTIC CARDIAC CATH LAB PROCEDURE  12/02/03    heart cath/PCI TO PROX LAD, PTCA TO MID LAD    ECHO COMPL W DOP COLOR FLOW  10/8/2013         LIVER BIOPSY  2011    neg for CA    TOTAL KNEE ARTHROPLASTY Left 11-4-15       Social History:    Social History     Tobacco Use    Smoking status: Never Smoker    Smokeless tobacco: Current User     Types: Chew    Tobacco comment: chews tobacco   Substance Use Topics    Alcohol use: No     Comment: used to drink 2 to 3 bottles of beer a day for 44 years,. quit 20 years ago. Ready to quit: Not Answered  Counseling given: Not Answered  Comment: chews tobacco      Vital Signs (Current):   Vitals:    06/24/19 1017   Weight: 225 lb (102.1 kg)   Height: 5' 8\" (1.727 m)                                              BP Readings from Last 3 Encounters:   06/13/19 120/74   12/18/18 (!) 100/58   10/25/18 90/60       NPO Status:                                                                                 BMI:   Wt Readings from Last 3 Encounters:   06/24/19 225 lb (102.1 kg)   06/13/19 248 lb (112.5 kg)   12/18/18 245 lb (111.1 kg)     Body mass index is 34.21 kg/m². CBC:   Lab Results   Component Value Date    WBC 5.5 06/24/2019    RBC 4.74 06/24/2019    HGB 14.7 06/24/2019    HCT 43.9 06/24/2019    MCV 92.6 06/24/2019    RDW 13.8 06/24/2019     06/24/2019       CMP:   Lab Results   Component Value Date     02/14/2019    K 5.2 02/14/2019     02/14/2019    CO2 25 02/14/2019    BUN 21 02/14/2019    CREATININE 0.9 02/14/2019    GFRAA >60 02/14/2019    LABGLOM >60 02/14/2019    GLUCOSE 181 02/14/2019    GLUCOSE 113 07/08/2011    PROT 7.3 02/14/2019    CALCIUM 9.2 02/14/2019    BILITOT 0.5 02/14/2019    ALKPHOS 53 02/14/2019    AST 19 02/14/2019    ALT 12 02/14/2019       POC Tests: No results for input(s): POCGLU, POCNA, POCK, POCCL, POCBUN, POCHEMO, POCHCT in the last 72 hours.     Coags:   Lab Results   Component Value Date    PROTIME 11.7 11/04/2015    PROTIME 15.7 12/11/2010    INR 1.0 11/04/2015    APTT 27.9 11/04/2015       HCG (If Applicable): No results found for: PREGTESTUR, PREGSERUM, HCG, HCGQUANT     ABGs: No results found for: PHART, PO2ART, DJG6UYI, RGV5QQS, BEART, A5HWVGLE     Type & Screen (If Applicable):  No results found for: [facility]    Anesthesia Evaluation  Patient summary reviewed and Nursing notes reviewed no history of anesthetic complications:   Airway: Mallampati: III  TM distance: <3 FB   Neck ROM: full  Mouth opening: > = 3 FB Dental:    (+) edentulous      Pulmonary: breath sounds clear to auscultation  (+) sleep apnea: on noncompliant,                            ROS comment: H/o sepsis` pneumonia approx 5 yrs ago   Cardiovascular:  Exercise tolerance: poor (<4 METS),   (+) hypertension:, pacemaker: AICD and pacemaker, past MI:, CAD:, dysrhythmias (PVC, RBBB, 1* AVB, LAFB): atrial fibrillation, CHF (ICM):,         Rhythm: regular  Rate: normal      Cleared by cardiology              Neuro/Psych:                ROS comment: [place] GI/Hepatic/Renal:   (+) liver disease:, renal disease (urinary incontinence):,          ROS comment: Prostatitis  cholecystectomy. Endo/Other:    (+) DiabetesType II DM, well controlled, , blood dyscrasia: anticoagulation therapy, arthritis: OA., . Pt had no PAT visit        ROS comment: S/p LKR Abdominal:   (+) obese,         Vascular:                                      Anesthesia Plan      MAC     ASA 4       Induction: intravenous. Anesthetic plan and risks discussed with patient and spouse. Use of blood products discussed with patient and spouse whom. Plan discussed with attending and CRNA. DAVY Simpson - CRNA   6/24/2019      Patient seen and examined, chart reviewed, agree with above findings. Anesthetic plan, risks, benefits, alternatives, and personnel involved discussed with patient. Patient verbalized an understanding and agreed to proceed. NPO status confirmed. Anesthetic plan discussed with care team members and agreed upon.     Fernanda Signs, DO   6/24/2019  12:24 PM

## 2019-06-24 NOTE — H&P
Electrophysiology Outpatient Follow-Up Note    Faith Merritt  1940  Date of Service: 6/24/2019  Referring Provider/PCP: Marian Chowdary MD  Chief Complaint: ICD in situ and atrial fibrillation        Subjective: Faith Merritt is seen today for outpatient follow-up. Since his last visit he offers no complaints. He offers no complaints from a device POV - his device site looks well healed and free from infection or erosion. The patient denies any chest pain, dyspnea, palpitations, dizziness, syncope, orthopnea or paroxysmal nocturnal dyspnea. Discussed with the patient about being followed remotely. He continues on Xarelto for stroke prevention, Tikosyn for rhythm control and Coreg for rate control. Upon interrogation today it shows that his device shows a fault code 1003 - per [employer]'s recommendation, battery needs changed within 30-45 days. Discussed with the patient about generator change today (see below).      Patient Active Problem List   Diagnosis    Urinary incontinence    Old anterior myocardial infarction    Ischemic cardiomyopathy    CAD S/P percutaneous coronary angioplasty    DM (diabetes mellitus) ([address]    HTN (hypertension)    Dyslipidemia    FABIANO (obstructive sleep apnea)    Chest pain    Frequent PVCs    RBBB (right bundle branch block)    Hypotension    Automatic implantable cardioverter-defibrillator in situ    A-fib ([address]    Morbid obesity due to excess calories ([address]    CAD (coronary artery disease)    LAFB (left anterior fascicular block)    AV block, 1st degree    Anticoagulated by anticoagulation treatment    Primary osteoarthritis of left knee    Ischemic dilated cardiomyopathy ([address]    Coronary artery disease    Type 2 diabetes mellitus with neurologic complication (HCC)    Hyperlipidemia    Chronic atrial fibrillation (HCC)    FABIANO on CPAP    Hypertension    Status post total left knee replacement    PAF (paroxysmal atrial fibrillation) ([address]    Primary osteoarthritis of right knee    Contusion of right knee    LV dysfunction    Chronic atrial fibrillation (HCC)    Ischemic cardiomyopathy    History of permanent cardiac pacemaker placement     Past Medical History:   Diagnosis Date    Anemia     STATUS POST TRANSFUSION 6/11    Arthritis     Atrial fibrillation (HCC)     CAD (coronary artery disease)     CHF (congestive heart failure) (HCC)     [facility]     Fractured rib     LEFT    History of blood transfusion     History of cardiovascular stress test 10/8/13    lexiscan    [place] (hard of hearing)     Hyperlipidemia     Hypertension     Hypotension 12/10    HOSPITALIZED WITH DEHYDRATION, HYPOTENSIONO AND ACUTE RENAL FAILURE SECONDARY TO URINARY TRACT OBSTRUCTION    Ischemic cardiomyopathy     Jaundice 6/11    HOSPITALIZED WITH OBSTRUCTIVE JAUNDICE WITH LIVER ABSCESS AND CHOLEYSTITIS STATUS POST DRAINAGE OF LIVER ABSCESS AND CHOLECYSTECTOMY.  Liver abscess 2011    Gallstone perforation    Obesity     Prostatitis     Septic shock(785.52) 4/11    HOPSITALIZED WITH FEVER AND SEVERE HYPOTENSION SECONDARY TO PROSTATITIS    Sleep apnea 5/12/11    POLYSOMNOGRAPHY, SEVERE OBSTRUCTIVE SLEEP APNEA.   PRESCRIBED C-PAP    Type II or unspecified type diabetes mellitus without mention of complication, not stated as uncontrolled     Urinary incontinence 10/2012    pt has appt with  urologist 11/2012    UTI (urinary tract infection) 6/08       Medications:  Current Outpatient Medications on File Prior to Encounter   Medication Sig Dispense Refill    dofetilide (TIKOSYN) 250 MCG capsule TAKE 1 CAPSULE BY MOUTH EVERY TWELVE (12) HOURS 180 capsule 3    metFORMIN (GLUCOPHAGE-XR) 500 MG extended release tablet TAKE 1 TABLET BY MOUTH TWO  2  TIMES DAILY AFTER MEALS  4    oxybutynin (DITROPAN) 5 MG tablet Take 10 mg by mouth every evening      donepezil (ARICEPT) 10 MG tablet Take 10 mg by mouth nightly      XARELTO 20 MG TABS tablet TAKE 1 TABLET BY MOUTH ONCE DAILY 90 tablet 3    Multiple Vitamins-Minerals (PRESERVISION AREDS 2+MULTI VIT PO) Take 1 tablet by mouth daily      JANUVIA 25 MG tablet Take 25 mg by mouth daily       glipiZIDE (GLUCOTROL) 10 MG ER tablet Take 10 mg by mouth daily       CRESTOR 10 MG tablet Take 1 tablet by mouth daily. 30 tablet 5    losartan (COZAAR) 25 MG tablet Take 1 tablet by mouth daily. 30 tablet 5    tamsulosin (FLOMAX) 0.4 MG capsule Take 1 capsule by mouth daily. 30 capsule 0    aspirin 81 MG chewable tablet Take 1 tablet by mouth daily. (Patient taking differently: Take 81 mg by mouth daily Not taking right now d/t upcoming eye surgery) 30 tablet 12    magnesium oxide (MAG-OX) 400 MG tablet Take 400 mg by mouth daily.  carvedilol (COREG) 12.5 MG tablet Take 12.5 mg by mouth 2 times daily (with meals).  Cholecalciferol (VITAMIN D3) 2000 UNITS CAPS Take 2,000 Units by mouth daily.  furosemide (LASIX) 40 MG tablet Take 40 mg by mouth daily       spironolactone (ALDACTONE) 25 MG tablet Take 12.5 mg by mouth daily 1/2 tab daily       No current facility-administered medications on file prior to encounter. No Known Allergies      Physical exam:  Constitutional:   Ht 5' 8\" (1.727 m)   Wt 225 lb (102.1 kg)   BMI 34.21 kg/m²  well developed, in no acute distress   Eyes: conjunctivae normal, no xanthelasma   Ears, Nose, Throat: oral mucosa moist, no cyanosis   Neck: supple, no JVD, no bruits, no thyromegaly   CV: regular rate & rhythm, normal S1 & S2, No S3 or S4. No murmurs, rubs, or gallops. Peripheral pulses normal   Lungs: clear to auscultation bilaterally, normal respiratory effort   Abdomen: soft, non-tender, bowel sounds present, no masses or hepatomegaly   Extremities: no digital clubbing, trace edema of LEs  Skin: warm, no rashes   Neuro/Psych: A&O x 3, normal mood and affect  ICD site: well healed.  No erosion, migration and infection    Data:   Lab Results   Component Value Date WBC 5.5 06/24/2019    HGB 14.7 06/24/2019    HCT 43.9 06/24/2019    MCV 92.6 06/24/2019     06/24/2019     Lab Results   Component Value Date    CREATININE 0.9 06/24/2019     Lab Results   Component Value Date    INR 1.0 11/04/2015    INR 1.5 10/28/2015    INR 1.9 04/24/2014    PROTIME 11.7 11/04/2015    PROTIME 18.2 (H) 10/28/2015    PROTIME 20.3 (H) 04/24/2014     Lab Results   Component Value Date    TSH 1.170 02/14/2019     Lab Results   Component Value Date    ALT 12 02/14/2019    AST 19 02/14/2019    ALKPHOS 53 02/14/2019    BILITOT 0.5 02/14/2019     Device Interrogation: 6/13/19   Underlying rhythm: ApVs  Mode: DDDR   Battery Voltage/Longevity:  Fault code 1 - battery needs changed within 30-45 days. Charge time: 11 seconds  Pacing: A: 28%  RV: 15%    P wave: 2.1 mV  Impedance: 389 ohms   Threshold: 0.5 V @ 0.5 ms  RV R wave: 18 mV  Impedance: 404 ohms   Threshold: 0.9 V @ 0.5 ms  Episodes: several ATR's (1-37 minutes)  Reprogramming included: see below  Overall device function is normal    All device programmable settings were evaluated per above and in the scanned document, along with iterative adjustments (capture thresholds) to assess and select the most appropriate final programming to provide for consistent delivery of the appropriate therapy and to verify function of the device. EKG 6/13/19: NSR, RBBB, LAFB, PACs, possible old anterior MI, QTc 474 msec    Impression:    1. Dual chamber ICD in situ    - DOI on 11/8/13 for primary prevention.    - Never has had a shock in the past.   - Proper device function. - Upon interrogation today it shows that his device shows a fault code 1003 - per [employer]'s recommendation, battery needs changed within 30-45 days.  - Risks, benefits, and alternatives of ICD generator replacement were discussed in detail today.  These risks include but are not limited to bleeding,infection, lead damage or discovery of a non-functional lead which would require lead revision and risks of blood clot, pneumothorax, hemothorax, cardiac perforation and tamponade, lead dislodgement, contrast induced nephropathy leading to short or even long term dialysis, vascular injury requiring emergent surgical repair, stroke and even death. The patient understands these risks and agrees to proceed with the procedure. 2. Ischemic cardiomyopathy    - Status post ICD. - LV EF was 20% on echocardiogram  in 2013.   - On Coreg, Cozaar, Aldactone and Lasix.  - NYHA class III, stage C.  - Compensated and euvolemic.     3. Paroxysmal atrial fibrillation    - [id number] score of 6.   - 4/24/14: DC Cardioversion.    - 5/29/14: DC cardioversion.   - On Tikosyn for rhythm control.  - On Xarelto for stroke risk reduction.    - Remians in NSR with stable QTc. Estimated Creatinine Clearance: 76 mL/min (based on SCr of 0.9 mg/dL). 4. Coronary artery disease    - Status post large anterior wall MI on 12/02/03.  - Management per cardiology.     5. Diabetes mellitus  - On Glucophage, Glipizide and Januvia. - Management per PCP.     6. Hypertension  - On Cozaar, Coreg, Aldactone and Lasix. - Management per PCP and cardiology.     7. Anemia of chronic disease      Recommendations:    1. Schedule patient for generator change - hold Xarelto 1 day prior to generator change. 2. Will check echocardiogram prior to the generator change. 3. Continue Tikosyn 250 mcg every 12 hours. 4. Continue Carvedilol 12.5 mg twice daily. 5. Continue Xarelto 20 mg daily for stroke risk reduction. 6. Monitor BMP, magnesium and EKG every 3 to 6 months. 7. Continue remote Latitude monitoring in 91 days. 8. Office follow-up with in 6 months and instructed to call if he has any questions or concerns    Thank you for allowing me the opportunity to participate in the care of your patient.      Iman Rees MD  Cardiac Electrophysiology  [address]  [facility] [place]  6/13/19   10:52 AM      Addendum:  Office note reviewed. No change has been made. Patient seen and examined. Risks, benefits and alternatives to the procedure discussed. The patient verbalizes understanding and agrees to proceed with the procedure.     Lyubov Rosenthal MD  Cardiac Electrophysiology  [facility]  [facility] [place] [place] [facility]  10:55 AM  6/24/2019

## 2019-06-25 ENCOUNTER — OFFICE VISIT (OUTPATIENT)
Dept: CARDIOLOGY CLINIC | Age: 79
End: 2019-06-25
Payer: MEDICARE

## 2019-06-25 VITALS
HEIGHT: 68 IN | DIASTOLIC BLOOD PRESSURE: 64 MMHG | HEART RATE: 63 BPM | SYSTOLIC BLOOD PRESSURE: 100 MMHG | WEIGHT: 248 LBS | BODY MASS INDEX: 37.59 KG/M2

## 2019-06-25 DIAGNOSIS — I44.0 FIRST DEGREE ATRIOVENTRICULAR BLOCK: ICD-10-CM

## 2019-06-25 DIAGNOSIS — I25.10 CORONARY ARTERY DISEASE INVOLVING NATIVE CORONARY ARTERY OF NATIVE HEART WITHOUT ANGINA PECTORIS: Primary | ICD-10-CM

## 2019-06-25 DIAGNOSIS — R32 URINARY INCONTINENCE, UNSPECIFIED TYPE: ICD-10-CM

## 2019-06-25 DIAGNOSIS — I45.10 RBBB (RIGHT BUNDLE BRANCH BLOCK): ICD-10-CM

## 2019-06-25 DIAGNOSIS — Z98.41 HISTORY OF RIGHT CATARACT EXTRACTION: ICD-10-CM

## 2019-06-25 DIAGNOSIS — E11.9 TYPE 2 DIABETES MELLITUS WITHOUT COMPLICATION, WITHOUT LONG-TERM CURRENT USE OF INSULIN (HCC): ICD-10-CM

## 2019-06-25 DIAGNOSIS — E66.01 MORBID OBESITY DUE TO EXCESS CALORIES (HCC): ICD-10-CM

## 2019-06-25 DIAGNOSIS — Z98.61 HISTORY OF PTCA: ICD-10-CM

## 2019-06-25 DIAGNOSIS — Z96.652 HISTORY OF LEFT KNEE REPLACEMENT: ICD-10-CM

## 2019-06-25 DIAGNOSIS — I48.0 PAF (PAROXYSMAL ATRIAL FIBRILLATION) (HCC): ICD-10-CM

## 2019-06-25 DIAGNOSIS — I25.5 ISCHEMIC CARDIOMYOPATHY: ICD-10-CM

## 2019-06-25 DIAGNOSIS — Z95.810 IMPLANTABLE CARDIOVERTER-DEFIBRILLATOR (ICD) IN SITU: ICD-10-CM

## 2019-06-25 DIAGNOSIS — I44.4 LAFB (LEFT ANTERIOR FASCICULAR BLOCK): ICD-10-CM

## 2019-06-25 DIAGNOSIS — I10 ESSENTIAL HYPERTENSION: ICD-10-CM

## 2019-06-25 DIAGNOSIS — Z79.01 ANTICOAGULATED BY ANTICOAGULATION TREATMENT: ICD-10-CM

## 2019-06-25 DIAGNOSIS — E78.5 DYSLIPIDEMIA: ICD-10-CM

## 2019-06-25 DIAGNOSIS — I25.2 HISTORY OF ANTERIOR WALL MYOCARDIAL INFARCTION: ICD-10-CM

## 2019-06-25 DIAGNOSIS — G47.33 OSA (OBSTRUCTIVE SLEEP APNEA): ICD-10-CM

## 2019-06-25 DIAGNOSIS — Z98.42 HISTORY OF LEFT CATARACT SURGERY: ICD-10-CM

## 2019-06-25 PROCEDURE — 4040F PNEUMOC VAC/ADMIN/RCVD: CPT | Performed by: INTERNAL MEDICINE

## 2019-06-25 PROCEDURE — 93000 ELECTROCARDIOGRAM COMPLETE: CPT | Performed by: INTERNAL MEDICINE

## 2019-06-25 PROCEDURE — G8417 CALC BMI ABV UP PARAM F/U: HCPCS | Performed by: INTERNAL MEDICINE

## 2019-06-25 PROCEDURE — 4004F PT TOBACCO SCREEN RCVD TLK: CPT | Performed by: INTERNAL MEDICINE

## 2019-06-25 PROCEDURE — G8598 ASA/ANTIPLAT THER USED: HCPCS | Performed by: INTERNAL MEDICINE

## 2019-06-25 PROCEDURE — 99214 OFFICE O/P EST MOD 30 MIN: CPT | Performed by: INTERNAL MEDICINE

## 2019-06-25 PROCEDURE — 1123F ACP DISCUSS/DSCN MKR DOCD: CPT | Performed by: INTERNAL MEDICINE

## 2019-06-25 PROCEDURE — G8427 DOCREV CUR MEDS BY ELIG CLIN: HCPCS | Performed by: INTERNAL MEDICINE

## 2019-06-25 NOTE — PATIENT INSTRUCTIONS
Patient Education        Heart-Healthy Diet: Care Instructions  Your Care Instructions    A heart-healthy diet has lots of vegetables, fruits, nuts, beans, and whole grains, and is low in salt. It limits foods that are high in saturated fat, such as meats, cheeses, and fried foods. It may be hard to change your diet, but even small changes can lower your risk of heart attack and heart disease. Follow-up care is a key part of your treatment and safety. Be sure to make and go to all appointments, and call your doctor if you are having problems. It's also a good idea to know your test results and keep a list of the medicines you take. How can you care for yourself at home? Watch your portions  · Learn what a serving is. A \"serving\" and a \"portion\" are not always the same thing. Make sure that you are not eating larger portions than are recommended. For example, a serving of pasta is ½ cup. A serving size of meat is 2 to 3 ounces. A 3-ounce serving is about the size of a deck of cards. Measure serving sizes until you are good at [place]" them. Keep in mind that restaurants often serve portions that are 2 or 3 times the size of one serving. · To keep your energy level up and keep you from feeling hungry, eat often but in smaller portions. · Eat only the number of calories you need to stay at a healthy weight. If you need to lose weight, eat fewer calories than your body burns (through exercise and other physical activity). Eat more fruits and vegetables  · Eat a variety of fruit and vegetables every day. Dark green, deep orange, red, or yellow fruits and vegetables are especially good for you. Examples include spinach, carrots, peaches, and berries. · Keep carrots, celery, and other veggies handy for snacks. Buy fruit that is in season and store it where you can see it so that you will be tempted to eat it. · Cook dishes that have a lot of veggies in them, such as stir-fries and soups.   Limit saturated and trans fat  · Read food labels, and try to avoid saturated and trans fats. They increase your risk of heart disease. Trans fat is found in many processed foods such as cookies and crackers. · Use olive or canola oil when you cook. Try cholesterol-lowering spreads, such as Benecol or Take Control. · Bake, broil, grill, or steam foods instead of frying them. · Choose lean meats instead of high-fat meats such as hot dogs and sausages. Cut off all visible fat when you prepare meat. · Eat fish, skinless poultry, and meat alternatives such as soy products instead of high-fat meats. Soy products, such as tofu, may be especially good for your heart. · Choose low-fat or fat-free milk and dairy products. Eat fish  · Eat at least two servings of fish a week. Certain fish, such as salmon and tuna, contain omega-3 fatty acids, which may help reduce your risk of heart attack. Eat foods high in fiber  · Eat a variety of grain products every day. Include whole-grain foods that have lots of fiber and nutrients. Examples of whole-grain foods include oats, whole wheat bread, and brown rice. · Buy whole-grain breads and cereals, instead of white bread or pastries. Limit salt and sodium  · Limit how much salt and sodium you eat to help lower your blood pressure. · Taste food before you salt it. Add only a little salt when you think you need it. With time, your taste buds will adjust to less salt. · Eat fewer snack items, fast foods, and other high-salt, processed foods. Check food labels for the amount of sodium in packaged foods. · Choose low-sodium versions of canned goods (such as soups, vegetables, and beans). Limit sugar  · Limit drinks and foods with added sugar. These include candy, desserts, and soda pop. Limit alcohol  · Limit alcohol to no more than 2 drinks a day for men and 1 drink a day for women. Too much alcohol can cause health problems. When should you call for help?   Watch closely for changes in your [employer], and be sure to contact your doctor if:    · You would like help planning heart-healthy meals. Where can you learn more? Go to [email or web address] and sign in to your [employer] account. Enter V137 in the [employer] box to learn more about \"Heart-Healthy Diet: Care Instructions. \"     If you do not have an account, please click on the \"Sign Up Now\" link. Current as of: July 22, 2018  Content Version: 12.0  © 2903-3883 Healthwise, Incorporated. Care instructions adapted under license by [facility] [place] ([place] [facility]). If you have questions about a medical condition or this instruction, always ask your healthcare professional. [address] any warranty or liability for your use of this information.

## 2019-06-25 NOTE — ANESTHESIA POSTPROCEDURE EVALUATION
Department of Anesthesiology  Postprocedure Note    Patient: Gus Wiley  MRN: [id number]  YOB: 1940  Date of evaluation: 6/25/2019  Time:  7:31 AM     Procedure Summary     Date:  06/24/19 Room / Location:  [facility] CATH LAB    Anesthesia Start:  1110 Anesthesia Stop:  1218    Procedure:  ICD REPLACEMENT W/ANES Diagnosis:  Presence of automatic (implantable) cardiac defibrillator    Scheduled Providers:  DAVY Garcia - CRNA; Elvi Monahan DO Responsible Provider:  Elvi Monahan DO    Anesthesia Type:  MAC ASA Status:  4          Anesthesia Type: MAC    Mary Phase I:      Mary Phase II:      Last vitals: Reviewed and per EMR flowsheets.        Anesthesia Post Evaluation    Patient location during evaluation: PACU  Patient participation: complete - patient participated  Level of consciousness: awake and alert  Pain score: 1  Airway patency: patent  Nausea & Vomiting: no nausea and no vomiting  Complications: no  Cardiovascular status: hemodynamically stable  Respiratory status: acceptable  Hydration status: euvolemic

## 2019-06-26 NOTE — PROGRESS NOTES
OFFICE VISIT        PRIMARY CARE PHYSICIAN:    Padilla Wan MD       ALLERGIES / SENSITIVITIES:    No Known Allergies       REVIEWED MEDICATIONS:      Current Outpatient Medications:     cephALEXin (KEFLEX) 500 MG capsule, Take 1 capsule by mouth 3 times daily, Disp: 9 capsule, Rfl: 0    dofetilide (TIKOSYN) 250 MCG capsule, TAKE 1 CAPSULE BY MOUTH EVERY TWELVE (12) HOURS, Disp: 180 capsule, Rfl: 3    metFORMIN (GLUCOPHAGE-XR) 500 MG extended release tablet, TAKE 1 TABLET BY MOUTH TWO  2  TIMES DAILY AFTER MEALS, Disp: , Rfl: 4    oxybutynin (DITROPAN) 5 MG tablet, Take 10 mg by mouth every evening, Disp: , Rfl:     donepezil (ARICEPT) 10 MG tablet, Take 10 mg by mouth nightly, Disp: , Rfl:     XARELTO 20 MG TABS tablet, TAKE 1 TABLET BY MOUTH ONCE DAILY, Disp: 90 tablet, Rfl: 3    Multiple Vitamins-Minerals (PRESERVISION AREDS 2+MULTI VIT PO), Take 1 tablet by mouth daily, Disp: , Rfl:     JANUVIA 25 MG tablet, Take 25 mg by mouth daily , Disp: , Rfl:     glipiZIDE (GLUCOTROL) 10 MG ER tablet, Take 10 mg by mouth daily , Disp: , Rfl:     CRESTOR 10 MG tablet, Take 1 tablet by mouth daily. , Disp: 30 tablet, Rfl: 5    losartan (COZAAR) 25 MG tablet, Take 1 tablet by mouth daily. , Disp: 30 tablet, Rfl: 5    tamsulosin (FLOMAX) 0.4 MG capsule, Take 1 capsule by mouth daily. , Disp: 30 capsule, Rfl: 0    aspirin 81 MG chewable tablet, Take 1 tablet by mouth daily. (Patient taking differently: Take 81 mg by mouth daily Not taking right now d/t upcoming eye surgery), Disp: 30 tablet, Rfl: 12    magnesium oxide (MAG-OX) 400 MG tablet, Take 400 mg by mouth daily. , Disp: , Rfl:     carvedilol (COREG) 12.5 MG tablet, Take 12.5 mg by mouth 2 times daily (with meals). , Disp: , Rfl:     Cholecalciferol (VITAMIN D3) 2000 UNITS CAPS, Take 2,000 Units by mouth daily. , Disp: , Rfl:     furosemide (LASIX) 40 MG tablet, Take 40 mg by mouth daily , Disp: , Rfl:     spironolactone (ALDACTONE) 25 MG tablet, Take 12.5 mg by mouth daily 1/2 tab daily, Disp: , Rfl:       S: REASON FOR VISIT:    Coronary artery disease/ischemic cardiomyopathy with severe LV systolic dysfunction. Navneet Florez is an 78-year-old male with a cardiovascular history as described below. He remain stable from a cardiovascular history. He remains stable from a cardiovascular standpoint and continues to walk 2 miles a day for exercise. He denies chest pain or dyspnea with his daily activities. He denies orthopnea, PND's, or lower extremity swelling. He denies palpitations, dizziness, presyncope or syncope or discharges from his ICD. He follows with [facility], Dr. Kurt Goncalves in regards to his ICD and also in regards to his paroxysmal atrial fibrillation. He had ICD generator replacement yesterday, on 6/24/2019. He had an echocardiogram done on 6/21/2019 for follow up LV systolic dysfunction, which showed moderate to severe LV systolic dysfunction with some improvement in the ejection fraction as compared to his prior echocardiogram.  (See below under cardiovascular history). REVIEW OF SYSTEMS:    CONSTITUTIONAL: Denies fevers, chills, night sweats or fatigue. HEENT: Denies headaches. C/O ptosis affecting vision. Denies dysphagia, hoarseness, hemoptysis, hematemesis or epistaxis. ENDOCRINE: Denies polyphagia, polydipsia or polyuria. Denies heat or cold intolerance. MUSCULOSKELETAL: Denies any significant arthralgias or myalgias currently. SKIN: Denies any rashes, ulcers or itching. HEME/LYMPH: Denies any palpable lymph nodes. He denies bleeding or easy bruisability. HEART: As above. LUNGS: Denies any cough or sputum production. GI: Denies abdominal pain, nausea, vomiting, diarrhea, constipation, rectal bleeding or tarry stools. : Denies hematuria or dysuria. PSYCHIATRIC: Denies mood changes, anxiety or depression.   NEUROLOGIC: Denies memory loss, motor weakness, numbness, tingling or tremors.        CARDIOVASCULAR HISTORY:   1. Hypertension. 2. Diabetes mellitus. 3. Morbid obesity. 4. Dyslipidemia. 5. Coronary artery disease/ischemic cardiomyopathy:  a. Status post large anterior wall MI on 12/02/03. b. 12/02/03 cardiac catheterization and angioplasty: Left main no disease. LAD occluded proximally. LCX 20% distal disease. RCA dominant vessel with 40%-50% proximal disease and 20%-30% distal disease.  Normal left ventricular size with anterolateral, apical and inferior apical akinesis with EF equal 15%-20%. Elevated EDP. Successful stenting to the proximal LAD with restoration of RUSSELL III flow. ReoPro was used during intervention procedure. Closure of the right femoral artery access site with Perclose. c. Treadmill nuclear stress test done on 1/21/09 showed a large fixed apical/periapical defect representing a scar with reportedly small areas of partial thickness revere redistribution involving the septum and anterior wall with an EF of 35%. d. 2/11/09 cardiac catheterization and angioplasty: Left main 30% ostial narrowing. LAD occluded at the origin of the first diagonal branch. LCX no significant disease. RCA 40%-50% proximal vessel stenosis. Normal LVEDP. Successful balloon angioplasty with the deployment of two overlapping drug eluting stents to the proximal/mid LAD with very good results. Closure of the right femoral artery access site with AngioSeal device.    e. [facility] study showing a large transmural myocardial infarction involving the left anterior descending artery distribution and possibly the right coronary artery distribution with no evidence of residual stress induced ischemia with the left ventricle appearing severely dilated with mild additional increase in the left ventricular cavity size after stress and with the gated views showing a large area of apical akinesis involving the adjacent anterior wall, the adjacent septum, the adjacent inferior wall and the adjacent lateral wall with severe hypokinesis of the rest of the inferior wall with a calculated ejection fraction of 28%.   f. Lexiscan nuclear stress test done on 8/22/2017 was a markedly abnormal study showing an infarction involving the anterior wall, the left ventricular apex and the apical inferior wall with residual stress-induced ischemia involving the interventricular septum, the basal inferior wall and the basal inferior wall with the left ventricle appearing severely dilated with additional increase in left ventricular cavity size after stress and with gated views showing septal motion consistent with conduction abnormality and/or base rhythm with severe generalized hypokinesis with a calculated ejection fraction of 27%. When compared to the study from 9/2015, fixed defects involving the LAD distribution and possibly the right coronary artery distribution were reported on that study (as above) with no reported evidence of reversibility and with the calculated ejection fraction of 28%.    6. Near-syncopal episode in 7/08 thought to be secondary to heat: The patient had a UTI at that time. 7. 10/10/13: Echocardiogram done at [address] [place] [address] was read by Dr. Julian as showing a dilated left ventricle with severe generalized hypokinesis with an estimated ejection fraction of 20% with mildly dilated right  ventricle, mildly dilated atria, mild mitral regurgitation and mild tricuspid regurgitation. 8. Status post ICD implantation (for primary prevention) on 11/8/13.   9. Paroxysmal atrial fibrillation noted on ICD interrogation on 2/27/14:    a. 4/24/14: DC Cardioversion. b. 5/29/14: DC cardioversion. c. On Tikosyn therapy. 10.  Echocardiogram done on 6/21/2019 for reassessment of LV systolic function showed a severe dilated left ventricle with filling and akinesis of the apical wall, the anteroseptal wall and the anterior wall with an estimated ejection fraction of 30-35%. 11.  Generator replacement of a dual chamber implant cardioverter defibrillator, [address] (on 2019).    PAST MEDICAL HISTORY:   1.  As under cardiovascular history. 2.  Fractured left rib. 3.  UTI in . 4.  12/10: Hospitalized with dehydration, hypotension and acute renal failure secondary to urinary tract obstruction. 5.  : Hospitalized with septic shock (fever and severe hypotension) secondary to prostatitis. 6.  11: Polysomnography: Severe obstructive sleep apnea: Patient prescribed C-PAP which he does not wear. 7.  : Hospitalized with obstructive jaundice with liver abscess and cholecystitis status post drainage of the liver abscess and cholecystectomy. 8.  Anemia. Status post transfusion in . Patient on iron supplement currently. 9.  S/P total left knee replacement on 2015. 10.  Bilateral cataract repair 2016. 11.  Urinary incontinence.     FAMILY HISTORY:   Mother  at age 80 \"old age\". Father  at age [de-identified] \"old age\". Three brothers  of MI. One sister  of cancer.      SOCIAL HISTORY:   The patient does not smoke and he does not drink alcohol. O:  COMPLETE PHYSICAL EXAM:      /64   Pulse 63   Ht 5' 8\" (1.727 m)   Wt 248 lb (112.5 kg)   BMI 37.71 kg/m²      General:           Morbidly obese male in no acute distress. Head & Neck:  Atraumatic/normocephalic head. No JVD. No carotid bruits. Carotid upstrokes somewhat diminished. No thyromegaly. Sclerae not icteric. No xanthelasmas. Mucous membranes moist.   Chest:              Symmetrical and nontender. ICD site well healed left upper chest  Lungs:             Clear bilaterally. Heart:              Normal S1, S2. No S3 or S4. No murmurs or rubs. Abdomen:        Soft, nontender without organomegaly or masses. Normal bowel sounds. No bruits. Extremities:     Trace edema. Distal pulses normal bilaterally. Skin:                Normal turgor.  No rashes or ulcers

## 2019-07-08 ENCOUNTER — NURSE ONLY (OUTPATIENT)
Dept: NON INVASIVE DIAGNOSTICS | Age: 79
End: 2019-07-08
Payer: MEDICARE

## 2019-07-08 DIAGNOSIS — I25.5 ISCHEMIC DILATED CARDIOMYOPATHY (HCC): Primary | ICD-10-CM

## 2019-07-08 DIAGNOSIS — Z95.810 AUTOMATIC IMPLANTABLE CARDIOVERTER-DEFIBRILLATOR IN SITU: ICD-10-CM

## 2019-07-08 DIAGNOSIS — I42.0 ISCHEMIC DILATED CARDIOMYOPATHY (HCC): Primary | ICD-10-CM

## 2019-07-08 PROCEDURE — 93283 PRGRMG EVAL IMPLANTABLE DFB: CPT | Performed by: INTERNAL MEDICINE

## 2019-07-12 ENCOUNTER — TELEPHONE (OUTPATIENT)
Dept: NON INVASIVE DIAGNOSTICS | Age: 79
End: 2019-07-12

## 2019-08-22 ENCOUNTER — TELEPHONE (OUTPATIENT)
Dept: NON INVASIVE DIAGNOSTICS | Age: 79
End: 2019-08-22

## 2019-08-27 ENCOUNTER — HOSPITAL ENCOUNTER (OUTPATIENT)
Age: 79
Discharge: HOME | End: 2019-08-29
Payer: MEDICARE

## 2019-08-27 PROCEDURE — 83036 HEMOGLOBIN GLYCOSYLATED A1C: CPT

## 2019-08-28 LAB — HBA1C MFR BLD: 8.7 % (ref 4–5.6)

## 2019-08-30 ENCOUNTER — HOSPITAL ENCOUNTER (OUTPATIENT)
Age: 79
Discharge: HOME | End: 2019-09-01
Payer: MEDICARE

## 2019-08-30 LAB — MICROALBUMIN UR-MCNC: 20.7 MG/L

## 2019-08-30 PROCEDURE — 82044 UR ALBUMIN SEMIQUANTITATIVE: CPT

## 2019-10-17 ENCOUNTER — TELEPHONE (OUTPATIENT)
Dept: NON INVASIVE DIAGNOSTICS | Age: 79
End: 2019-10-17

## 2019-10-29 ENCOUNTER — NURSE ONLY (OUTPATIENT)
Dept: NON INVASIVE DIAGNOSTICS | Age: 79
End: 2019-10-29
Payer: MEDICARE

## 2019-10-29 DIAGNOSIS — I25.5 ISCHEMIC DILATED CARDIOMYOPATHY (HCC): Chronic | ICD-10-CM

## 2019-10-29 DIAGNOSIS — I48.20 CHRONIC ATRIAL FIBRILLATION (HCC): Chronic | ICD-10-CM

## 2019-10-29 DIAGNOSIS — Z95.810 AUTOMATIC IMPLANTABLE CARDIOVERTER-DEFIBRILLATOR IN SITU: Primary | ICD-10-CM

## 2019-10-29 DIAGNOSIS — I42.0 ISCHEMIC DILATED CARDIOMYOPATHY (HCC): Chronic | ICD-10-CM

## 2019-10-29 PROCEDURE — 93283 PRGRMG EVAL IMPLANTABLE DFB: CPT | Performed by: INTERNAL MEDICINE

## 2019-12-19 ENCOUNTER — OFFICE VISIT (OUTPATIENT)
Dept: CARDIOLOGY CLINIC | Age: 79
End: 2019-12-19
Payer: MEDICARE

## 2019-12-19 VITALS
BODY MASS INDEX: 37.89 KG/M2 | SYSTOLIC BLOOD PRESSURE: 118 MMHG | HEART RATE: 70 BPM | WEIGHT: 250 LBS | HEIGHT: 68 IN | DIASTOLIC BLOOD PRESSURE: 58 MMHG

## 2019-12-19 DIAGNOSIS — Z95.810 IMPLANTABLE CARDIOVERTER-DEFIBRILLATOR (ICD) IN SITU: ICD-10-CM

## 2019-12-19 DIAGNOSIS — E78.5 DYSLIPIDEMIA: ICD-10-CM

## 2019-12-19 DIAGNOSIS — Z96.652 HISTORY OF TOTAL LEFT KNEE REPLACEMENT: ICD-10-CM

## 2019-12-19 DIAGNOSIS — I25.2 HISTORY OF ANTERIOR WALL MYOCARDIAL INFARCTION: ICD-10-CM

## 2019-12-19 DIAGNOSIS — E11.65 POORLY CONTROLLED TYPE 2 DIABETES MELLITUS (HCC): ICD-10-CM

## 2019-12-19 DIAGNOSIS — I45.10 RBBB (RIGHT BUNDLE BRANCH BLOCK): ICD-10-CM

## 2019-12-19 DIAGNOSIS — I44.4 LAFB (LEFT ANTERIOR FASCICULAR BLOCK): ICD-10-CM

## 2019-12-19 DIAGNOSIS — Z98.41 HISTORY OF RIGHT CATARACT EXTRACTION: ICD-10-CM

## 2019-12-19 DIAGNOSIS — I25.10 CORONARY ARTERY DISEASE INVOLVING NATIVE CORONARY ARTERY OF NATIVE HEART WITHOUT ANGINA PECTORIS: Primary | ICD-10-CM

## 2019-12-19 DIAGNOSIS — Z79.01 ANTICOAGULATED BY ANTICOAGULATION TREATMENT: ICD-10-CM

## 2019-12-19 DIAGNOSIS — Z98.61 HISTORY OF PTCA: ICD-10-CM

## 2019-12-19 DIAGNOSIS — I48.0 PAF (PAROXYSMAL ATRIAL FIBRILLATION) (HCC): ICD-10-CM

## 2019-12-19 DIAGNOSIS — R32 URINARY INCONTINENCE, UNSPECIFIED TYPE: ICD-10-CM

## 2019-12-19 DIAGNOSIS — E66.01 MORBID OBESITY DUE TO EXCESS CALORIES (HCC): ICD-10-CM

## 2019-12-19 DIAGNOSIS — Z98.42 HISTORY OF LEFT CATARACT EXTRACTION: ICD-10-CM

## 2019-12-19 DIAGNOSIS — I25.5 ISCHEMIC CARDIOMYOPATHY: ICD-10-CM

## 2019-12-19 DIAGNOSIS — I44.0 FIRST DEGREE ATRIOVENTRICULAR BLOCK: ICD-10-CM

## 2019-12-19 DIAGNOSIS — I10 ESSENTIAL HYPERTENSION: ICD-10-CM

## 2019-12-19 DIAGNOSIS — G47.33 OSA (OBSTRUCTIVE SLEEP APNEA): ICD-10-CM

## 2019-12-19 PROCEDURE — G8484 FLU IMMUNIZE NO ADMIN: HCPCS | Performed by: INTERNAL MEDICINE

## 2019-12-19 PROCEDURE — G8417 CALC BMI ABV UP PARAM F/U: HCPCS | Performed by: INTERNAL MEDICINE

## 2019-12-19 PROCEDURE — 4040F PNEUMOC VAC/ADMIN/RCVD: CPT | Performed by: INTERNAL MEDICINE

## 2019-12-19 PROCEDURE — G8598 ASA/ANTIPLAT THER USED: HCPCS | Performed by: INTERNAL MEDICINE

## 2019-12-19 PROCEDURE — 99213 OFFICE O/P EST LOW 20 MIN: CPT | Performed by: INTERNAL MEDICINE

## 2019-12-19 PROCEDURE — G8427 DOCREV CUR MEDS BY ELIG CLIN: HCPCS | Performed by: INTERNAL MEDICINE

## 2019-12-19 PROCEDURE — 1123F ACP DISCUSS/DSCN MKR DOCD: CPT | Performed by: INTERNAL MEDICINE

## 2019-12-19 PROCEDURE — 4004F PT TOBACCO SCREEN RCVD TLK: CPT | Performed by: INTERNAL MEDICINE

## 2019-12-19 PROCEDURE — 93000 ELECTROCARDIOGRAM COMPLETE: CPT | Performed by: INTERNAL MEDICINE

## 2020-01-29 ENCOUNTER — NURSE ONLY (OUTPATIENT)
Dept: NON INVASIVE DIAGNOSTICS | Age: 80
End: 2020-01-29
Payer: MEDICARE

## 2020-01-29 PROCEDURE — 93296 REM INTERROG EVL PM/IDS: CPT | Performed by: INTERNAL MEDICINE

## 2020-01-29 PROCEDURE — 93295 DEV INTERROG REMOTE 1/2/MLT: CPT | Performed by: INTERNAL MEDICINE

## 2020-02-05 NOTE — PROGRESS NOTES
See [employer] [place] report. Remote monitoring reviewed over a 90 day period. End of 90 day monitoring period date of service 1-.

## 2020-03-05 ENCOUNTER — HOSPITAL ENCOUNTER (OUTPATIENT)
Age: 80
Discharge: HOME | End: 2020-03-07
Payer: MEDICARE

## 2020-03-05 LAB
ALBUMIN SERPL-MCNC: 4.1 G/DL (ref 3.5–5.2)
ALP BLD-CCNC: 26 U/L (ref 40–129)
ALT SERPL-CCNC: 15 U/L (ref 0–40)
ANION GAP SERPL CALCULATED.3IONS-SCNC: 18 MMOL/L (ref 7–16)
AST SERPL-CCNC: 21 U/L (ref 0–39)
BASOPHILS ABSOLUTE: 0.04 E9/L (ref 0–0.2)
BASOPHILS RELATIVE PERCENT: 0.7 % (ref 0–2)
BILIRUB SERPL-MCNC: 0.6 MG/DL (ref 0–1.2)
BUN BLDV-MCNC: 16 MG/DL (ref 8–23)
CALCIUM SERPL-MCNC: 9.5 MG/DL (ref 8.6–10.2)
CHLORIDE BLD-SCNC: 105 MMOL/L (ref 98–107)
CHOLESTEROL, TOTAL: 267 MG/DL (ref 0–199)
CO2: 22 MMOL/L (ref 22–29)
CREAT SERPL-MCNC: 1.3 MG/DL (ref 0.7–1.2)
EOSINOPHILS ABSOLUTE: 0.18 E9/L (ref 0.05–0.5)
EOSINOPHILS RELATIVE PERCENT: 3.1 % (ref 0–6)
GFR AFRICAN AMERICAN: >60
GFR NON-AFRICAN AMERICAN: 53 ML/MIN/1.73
GLUCOSE BLD-MCNC: 138 MG/DL (ref 74–99)
HBA1C MFR BLD: 6.9 % (ref 4–5.6)
HCT VFR BLD CALC: 43.6 % (ref 37–54)
HDLC SERPL-MCNC: 32 MG/DL
HEMOGLOBIN: 13.6 G/DL (ref 12.5–16.5)
IMMATURE GRANULOCYTES #: 0.03 E9/L
IMMATURE GRANULOCYTES %: 0.5 % (ref 0–5)
LDL CHOLESTEROL CALCULATED: 157 MG/DL (ref 0–99)
LYMPHOCYTES ABSOLUTE: 1.1 E9/L (ref 1.5–4)
LYMPHOCYTES RELATIVE PERCENT: 18.7 % (ref 20–42)
MCH RBC QN AUTO: 30.5 PG (ref 26–35)
MCHC RBC AUTO-ENTMCNC: 31.2 % (ref 32–34.5)
MCV RBC AUTO: 97.8 FL (ref 80–99.9)
MICROALBUMIN UR-MCNC: 45.4 MG/L
MONOCYTES ABSOLUTE: 0.6 E9/L (ref 0.1–0.95)
MONOCYTES RELATIVE PERCENT: 10.2 % (ref 2–12)
NEUTROPHILS ABSOLUTE: 3.94 E9/L (ref 1.8–7.3)
NEUTROPHILS RELATIVE PERCENT: 66.8 % (ref 43–80)
PDW BLD-RTO: 13.7 FL (ref 11.5–15)
PLATELET # BLD: 269 E9/L (ref 130–450)
PMV BLD AUTO: 10.3 FL (ref 7–12)
POTASSIUM SERPL-SCNC: 4.6 MMOL/L (ref 3.5–5)
RBC # BLD: 4.46 E12/L (ref 3.8–5.8)
SODIUM BLD-SCNC: 145 MMOL/L (ref 132–146)
TOTAL PROTEIN: 7.4 G/DL (ref 6.4–8.3)
TRIGL SERPL-MCNC: 388 MG/DL (ref 0–149)
VLDLC SERPL CALC-MCNC: 78 MG/DL
WBC # BLD: 5.9 E9/L (ref 4.5–11.5)

## 2020-03-05 PROCEDURE — 80053 COMPREHEN METABOLIC PANEL: CPT

## 2020-03-05 PROCEDURE — 82044 UR ALBUMIN SEMIQUANTITATIVE: CPT

## 2020-03-05 PROCEDURE — 80061 LIPID PANEL: CPT

## 2020-03-05 PROCEDURE — 85025 COMPLETE CBC W/AUTO DIFF WBC: CPT

## 2020-03-05 PROCEDURE — 83036 HEMOGLOBIN GLYCOSYLATED A1C: CPT

## 2020-03-10 ENCOUNTER — APPOINTMENT (OUTPATIENT)
Dept: GENERAL RADIOLOGY | Age: 80
End: 2020-03-10
Payer: MEDICARE

## 2020-03-10 ENCOUNTER — HOSPITAL ENCOUNTER (EMERGENCY)
Age: 80
Discharge: HOME | End: 2020-03-10
Attending: EMERGENCY MEDICINE
Payer: MEDICARE

## 2020-03-10 ENCOUNTER — APPOINTMENT (OUTPATIENT)
Dept: CT IMAGING | Age: 80
End: 2020-03-10
Payer: MEDICARE

## 2020-03-10 VITALS
BODY MASS INDEX: 27.92 KG/M2 | DIASTOLIC BLOOD PRESSURE: 78 MMHG | SYSTOLIC BLOOD PRESSURE: 110 MMHG | OXYGEN SATURATION: 97 % | TEMPERATURE: 98.1 F | HEIGHT: 70 IN | HEART RATE: 88 BPM | WEIGHT: 195 LBS | RESPIRATION RATE: 20 BRPM

## 2020-03-10 LAB
ACETAMINOPHEN LEVEL: <5 MCG/ML (ref 10–30)
ALBUMIN SERPL-MCNC: 3.5 G/DL (ref 3.5–5.2)
ALP BLD-CCNC: 29 U/L (ref 40–129)
ALT SERPL-CCNC: 17 U/L (ref 0–40)
AMMONIA: 25 UMOL/L (ref 16–60)
AMPHETAMINE SCREEN, URINE: NOT DETECTED
ANION GAP SERPL CALCULATED.3IONS-SCNC: 16 MMOL/L (ref 7–16)
AST SERPL-CCNC: 24 U/L (ref 0–39)
BACTERIA: ABNORMAL /HPF
BARBITURATE SCREEN URINE: NOT DETECTED
BASOPHILS ABSOLUTE: 0.03 E9/L (ref 0–0.2)
BASOPHILS RELATIVE PERCENT: 0.5 % (ref 0–2)
BENZODIAZEPINE SCREEN, URINE: NOT DETECTED
BILIRUB SERPL-MCNC: 0.4 MG/DL (ref 0–1.2)
BILIRUBIN URINE: NEGATIVE
BLOOD, URINE: ABNORMAL
BUN BLDV-MCNC: 18 MG/DL (ref 8–23)
CALCIUM SERPL-MCNC: 9.1 MG/DL (ref 8.6–10.2)
CANNABINOID SCREEN URINE: NOT DETECTED
CHLORIDE BLD-SCNC: 104 MMOL/L (ref 98–107)
CHP ED QC CHECK: YES
CLARITY: ABNORMAL
CO2: 19 MMOL/L (ref 22–29)
COCAINE METABOLITE SCREEN URINE: NOT DETECTED
COLOR: YELLOW
CREAT SERPL-MCNC: 1.3 MG/DL (ref 0.7–1.2)
EKG ATRIAL RATE: 84 BPM
EKG P AXIS: 57 DEGREES
EKG P-R INTERVAL: 254 MS
EKG Q-T INTERVAL: 430 MS
EKG QRS DURATION: 154 MS
EKG QTC CALCULATION (BAZETT): 508 MS
EKG R AXIS: -57 DEGREES
EKG T AXIS: 24 DEGREES
EKG VENTRICULAR RATE: 84 BPM
EOSINOPHILS ABSOLUTE: 0.05 E9/L (ref 0.05–0.5)
EOSINOPHILS RELATIVE PERCENT: 0.8 % (ref 0–6)
ETHANOL: <10 MG/DL (ref 0–0.08)
FENTANYL SCREEN, URINE: NOT DETECTED
GFR AFRICAN AMERICAN: >60
GFR NON-AFRICAN AMERICAN: 53 ML/MIN/1.73
GLUCOSE BLD-MCNC: 150 MG/DL
GLUCOSE BLD-MCNC: 150 MG/DL (ref 74–99)
GLUCOSE URINE: 100 MG/DL
HCT VFR BLD CALC: 39.8 % (ref 37–54)
HEMOGLOBIN: 12.9 G/DL (ref 12.5–16.5)
IMMATURE GRANULOCYTES #: 0.03 E9/L
IMMATURE GRANULOCYTES %: 0.5 % (ref 0–5)
INFLUENZA A BY PCR: NOT DETECTED
INFLUENZA B BY PCR: NOT DETECTED
INR BLD: 1
KETONES, URINE: ABNORMAL MG/DL
LACTIC ACID, SEPSIS: 3.8 MMOL/L (ref 0.5–1.9)
LEUKOCYTE ESTERASE, URINE: ABNORMAL
LYMPHOCYTES ABSOLUTE: 0.75 E9/L (ref 1.5–4)
LYMPHOCYTES RELATIVE PERCENT: 12.4 % (ref 20–42)
Lab: NORMAL
MCH RBC QN AUTO: 30.9 PG (ref 26–35)
MCHC RBC AUTO-ENTMCNC: 32.4 % (ref 32–34.5)
MCV RBC AUTO: 95.2 FL (ref 80–99.9)
METHADONE SCREEN, URINE: NOT DETECTED
MONOCYTES ABSOLUTE: 0.89 E9/L (ref 0.1–0.95)
MONOCYTES RELATIVE PERCENT: 14.7 % (ref 2–12)
NEUTROPHILS ABSOLUTE: 4.31 E9/L (ref 1.8–7.3)
NEUTROPHILS RELATIVE PERCENT: 71.1 % (ref 43–80)
NITRITE, URINE: POSITIVE
OPIATE SCREEN URINE: NOT DETECTED
OXYCODONE URINE: NOT DETECTED
PDW BLD-RTO: 13.7 FL (ref 11.5–15)
PH UA: 5 (ref 5–9)
PHENCYCLIDINE SCREEN URINE: NOT DETECTED
PLATELET # BLD: 214 E9/L (ref 130–450)
PMV BLD AUTO: 10.1 FL (ref 7–12)
POTASSIUM REFLEX MAGNESIUM: 4.1 MMOL/L (ref 3.5–5)
PROTEIN UA: NEGATIVE MG/DL
PROTHROMBIN TIME: 11.3 SEC (ref 9.3–12.4)
RBC # BLD: 4.18 E12/L (ref 3.8–5.8)
RBC UA: ABNORMAL /HPF (ref 0–2)
SALICYLATE, SERUM: <0.3 MG/DL (ref 0–30)
SODIUM BLD-SCNC: 139 MMOL/L (ref 132–146)
SPECIFIC GRAVITY UA: 1.02 (ref 1–1.03)
T4 TOTAL: 6.4 MCG/DL (ref 4.5–11.7)
TOTAL CK: 78 U/L (ref 20–200)
TOTAL PROTEIN: 6.8 G/DL (ref 6.4–8.3)
TRICYCLIC ANTIDEPRESSANTS SCREEN SERUM: NEGATIVE NG/ML
TROPONIN: <0.01 NG/ML (ref 0–0.03)
TSH SERPL DL<=0.05 MIU/L-ACNC: 1.15 UIU/ML (ref 0.27–4.2)
UROBILINOGEN, URINE: 0.2 E.U./DL
WBC # BLD: 6.1 E9/L (ref 4.5–11.5)
WBC UA: >20 /HPF (ref 0–5)

## 2020-03-10 PROCEDURE — 84484 ASSAY OF TROPONIN QUANT: CPT

## 2020-03-10 PROCEDURE — 2580000003 HC RX 258: Performed by: EMERGENCY MEDICINE

## 2020-03-10 PROCEDURE — 93005 ELECTROCARDIOGRAM TRACING: CPT | Performed by: STUDENT IN AN ORGANIZED HEALTH CARE EDUCATION/TRAINING PROGRAM

## 2020-03-10 PROCEDURE — 82550 ASSAY OF CK (CPK): CPT

## 2020-03-10 PROCEDURE — 84443 ASSAY THYROID STIM HORMONE: CPT

## 2020-03-10 PROCEDURE — 87502 INFLUENZA DNA AMP PROBE: CPT

## 2020-03-10 PROCEDURE — 80307 DRUG TEST PRSMV CHEM ANLYZR: CPT

## 2020-03-10 PROCEDURE — 82140 ASSAY OF AMMONIA: CPT

## 2020-03-10 PROCEDURE — 71045 X-RAY EXAM CHEST 1 VIEW: CPT

## 2020-03-10 PROCEDURE — 93010 ELECTROCARDIOGRAM REPORT: CPT | Performed by: INTERNAL MEDICINE

## 2020-03-10 PROCEDURE — 99284 EMERGENCY DEPT VISIT MOD MDM: CPT

## 2020-03-10 PROCEDURE — 81001 URINALYSIS AUTO W/SCOPE: CPT

## 2020-03-10 PROCEDURE — 85610 PROTHROMBIN TIME: CPT

## 2020-03-10 PROCEDURE — 2580000003 HC RX 258: Performed by: STUDENT IN AN ORGANIZED HEALTH CARE EDUCATION/TRAINING PROGRAM

## 2020-03-10 PROCEDURE — 96365 THER/PROPH/DIAG IV INF INIT: CPT

## 2020-03-10 PROCEDURE — 83605 ASSAY OF LACTIC ACID: CPT

## 2020-03-10 PROCEDURE — G0480 DRUG TEST DEF 1-7 CLASSES: HCPCS

## 2020-03-10 PROCEDURE — 70450 CT HEAD/BRAIN W/O DYE: CPT

## 2020-03-10 PROCEDURE — 6360000002 HC RX W HCPCS: Performed by: STUDENT IN AN ORGANIZED HEALTH CARE EDUCATION/TRAINING PROGRAM

## 2020-03-10 PROCEDURE — 85025 COMPLETE CBC W/AUTO DIFF WBC: CPT

## 2020-03-10 PROCEDURE — 84436 ASSAY OF TOTAL THYROXINE: CPT

## 2020-03-10 PROCEDURE — 72125 CT NECK SPINE W/O DYE: CPT

## 2020-03-10 PROCEDURE — 80053 COMPREHEN METABOLIC PANEL: CPT

## 2020-03-10 PROCEDURE — 36415 COLL VENOUS BLD VENIPUNCTURE: CPT

## 2020-03-10 RX ORDER — CEFDINIR 300 MG/1
300 CAPSULE ORAL 2 TIMES DAILY
Qty: 14 CAPSULE | Refills: 0 | Status: SHIPPED | OUTPATIENT
Start: 2020-03-10 | End: 2020-03-10 | Stop reason: SDUPTHER

## 2020-03-10 RX ORDER — CEFDINIR 300 MG/1
300 CAPSULE ORAL 2 TIMES DAILY
Qty: 14 CAPSULE | Refills: 0 | Status: SHIPPED | OUTPATIENT
Start: 2020-03-10 | End: 2020-03-17

## 2020-03-10 RX ORDER — 0.9 % SODIUM CHLORIDE 0.9 %
1000 INTRAVENOUS SOLUTION INTRAVENOUS ONCE
Status: COMPLETED | OUTPATIENT
Start: 2020-03-10 | End: 2020-03-10

## 2020-03-10 RX ADMIN — CEFTRIAXONE SODIUM 1 G: 1 INJECTION, POWDER, FOR SOLUTION INTRAMUSCULAR; INTRAVENOUS at 21:23

## 2020-03-10 RX ADMIN — SODIUM CHLORIDE 1000 ML: 9 INJECTION, SOLUTION INTRAVENOUS at 19:57

## 2020-03-10 RX ADMIN — SODIUM CHLORIDE 1000 ML: 9 INJECTION, SOLUTION INTRAVENOUS at 21:00

## 2020-03-10 NOTE — ED PROVIDER NOTES
person. Not place or time. Intention tremor present. Procedures     [facility] [place]     ED Course as of Mar 11 0150   Tue Mar 10, 2020   1925 Lactic Acid, Sepsis(!): 3.8 [WL]   1953 EKG read by me. Shows rate 84 beats minute. Left axis deviation. Right bundle branch block. When compared to previous EKG there is no significant changes. QTC is prolonged at 508 from previous values of 473. [WL]   2001 Family is present at bedside. They state that the patient was at a financial office for which she was getting an ID for his daughter and when the family returned to pick him up he was nowhere to be found. Took over 30 minutes for him to find him. Patient does family states that think he may be having dementia and has been increasingly confused. [WL]   Wed Mar 11, 2020   0149 Patient was found to have a UTI and was given IV antibiotics here and discharged home with a prescription for 1.    [WL]      ED Course User Index  [WL] Tashi Rogel, DO        --------------------------------------------- PAST HISTORY ---------------------------------------------  Past Medical History:  has a past medical history of Anemia, Arthritis, Atrial fibrillation ([facility] [address] CAD (coronary artery disease), CHF (congestive heart failure) ([facility] [address] Diabetes mellitus ([facility] [address] Fractured rib, History of blood transfusion, History of cardiovascular stress test, [place] (hard of hearing), Hyperlipidemia, Hypertension, Hypotension, Ischemic cardiomyopathy, Jaundice, Liver abscess, Obesity, Prostatitis, Septic shock(785.52), Sleep apnea, Type II or unspecified type diabetes mellitus without mention of complication, not stated as uncontrolled, Urinary incontinence, and UTI (urinary tract infection). Past Surgical History:  has a past surgical history that includes Diagnostic Cardiac Cath Lab Procedure (2/11/09); Diagnostic Cardiac Cath Lab Procedure (12/02/03);  Cholecystectomy; liver biopsy (2011); ECHO Compl W Dop Color Flow (10/8/2013); Cardiac defibrillator placement (11/08/2013); Cardioversion (April 2014); Cardioversion (May 2014); Total knee arthroplasty (Left, 11-4-15); Cataract removal (Bilateral); and Cardiac defibrillator placement (06/24/2019). Social History:  reports that he has never smoked. His smokeless tobacco use includes chew. He reports that he does not drink alcohol or use drugs. Family History: family history includes Heart Defect in his brother, brother, and brother. The patients home medications have been reviewed. Allergies: Patient has no known allergies.     -------------------------------------------------- RESULTS -------------------------------------------------  Labs:  Results for orders placed or performed during the hospital encounter of 03/10/20   Rapid influenza A/B antigens   Result Value Ref Range    Influenza A by PCR Not Detected Not Detected    Influenza B by PCR Not Detected Not Detected   CBC Auto Differential   Result Value Ref Range    WBC 6.1 4.5 - 11.5 E9/L    RBC 4.18 3.80 - 5.80 E12/L    Hemoglobin 12.9 12.5 - 16.5 g/dL    Hematocrit 39.8 37.0 - 54.0 %    MCV 95.2 80.0 - 99.9 fL    MCH 30.9 26.0 - 35.0 pg    MCHC 32.4 32.0 - 34.5 %    RDW 13.7 11.5 - 15.0 fL    Platelets 600 837 - 544 E9/L    MPV 10.1 7.0 - 12.0 fL    Neutrophils % 71.1 43.0 - 80.0 %    Immature Granulocytes % 0.5 0.0 - 5.0 %    Lymphocytes % 12.4 (L) 20.0 - 42.0 %    Monocytes % 14.7 (H) 2.0 - 12.0 %    Eosinophils % 0.8 0.0 - 6.0 %    Basophils % 0.5 0.0 - 2.0 %    Neutrophils Absolute 4.31 1.80 - 7.30 E9/L    Immature Granulocytes # 0.03 E9/L    Lymphocytes Absolute 0.75 (L) 1.50 - 4.00 E9/L    Monocytes Absolute 0.89 0.10 - 0.95 E9/L    Eosinophils Absolute 0.05 0.05 - 0.50 E9/L    Basophils Absolute 0.03 0.00 - 0.20 E9/L   Ammonia   Result Value Ref Range    Ammonia 25.0 16.0 - 60.0 umol/L   TSH without Reflex   Result Value Ref Range    TSH 1.150 0.270 - 4.200 uIU/mL   T4   Result Value Ref Range    T4, Total 6.4 4.5 - 11.7 mcg/dL   Protime-INR   Result Value Ref Range    Protime 11.3 9.3 - 12.4 sec    INR 1.0    Troponin   Result Value Ref Range    Troponin <0.01 0.00 - 0.03 ng/mL   Lactate, Sepsis   Result Value Ref Range    Lactic Acid, Sepsis 3.8 (H) 0.5 - 1.9 mmol/L   Urinalysis   Result Value Ref Range    Color, UA Yellow Straw/Yellow    Clarity, UA TURBID (A) Clear    Glucose, Ur 100 (A) Negative mg/dL    Bilirubin Urine Negative Negative    Ketones, Urine TRACE (A) Negative mg/dL    Specific Gravity, UA 1.025 1.005 - 1.030    Blood, Urine MODERATE (A) Negative    pH, UA 5.0 5.0 - 9.0    Protein, UA Negative Negative mg/dL    Urobilinogen, Urine 0.2 <2.0 E.U./dL    Nitrite, Urine POSITIVE (A) Negative    Leukocyte Esterase, Urine MODERATE (A) Negative   Urine Drug Screen   Result Value Ref Range    Amphetamine Screen, Urine NOT DETECTED Negative <1000 ng/mL    Barbiturate Screen, Ur NOT DETECTED Negative < 200 ng/mL    Benzodiazepine Screen, Urine NOT DETECTED Negative < 200 ng/mL    Cannabinoid Scrn, Ur NOT DETECTED Negative < 50ng/mL    Cocaine Metabolite Screen, Urine NOT DETECTED Negative < 300 ng/mL    Opiate Scrn, Ur NOT DETECTED Negative < 300ng/mL    PCP Screen, Urine NOT DETECTED Negative < 25 ng/mL    Methadone Screen, Urine NOT DETECTED Negative <300 ng/mL    Oxycodone Urine NOT DETECTED Negative <100 ng/mL    FENTANYL SCREEN, URINE NOT DETECTED Negative <1 ng/mL    Drug Screen Comment: see below    Serum Drug Screen   Result Value Ref Range    Ethanol Lvl <10 mg/dL    Acetaminophen Level <5.0 (L) 10.0 - 64.5 mcg/mL    Salicylate, Serum <4.1 0.0 - 30.0 mg/dL    TCA Scrn NEGATIVE Cutoff:300 ng/mL   Comprehensive Metabolic Panel w/ Reflex to MG   Result Value Ref Range    Sodium 139 132 - 146 mmol/L    Potassium reflex Magnesium 4.1 3.5 - 5.0 mmol/L    Chloride 104 98 - 107 mmol/L    CO2 19 (L) 22 - 29 mmol/L    Anion Gap 16 7 - 16 mmol/L    Glucose 150 (H) 74 - 99 mg/dL    BUN 18 8 - 23 mg/dL    CREATININE 1.3 follow-up. The plan has been discussed in detail and they are aware of the specific conditions for emergent return, as well as the importance of follow-up. Discharge Medication List as of 3/10/2020  9:43 PM          Diagnosis:  1. Acute cystitis with hematuria    2. Confusion    3. Other fatigue    4. Fall, initial encounter        Disposition:  Patient's disposition: Discharge  Patient's condition is stable. NOTE:  This report was transcribed using voice recognition software. Efforts were made to ensure accuracy; however, inadvertent computerized transcription errors may be present.           Skyler Yeung DO  Resident  03/11/20 [id number]

## 2020-03-10 NOTE — ED NOTES
Complete bath provided due to dried BM over lower extremities and hands     Johanny Duggan RN  03/10/20 [id number]

## 2020-04-29 ENCOUNTER — NURSE ONLY (OUTPATIENT)
Dept: NON INVASIVE DIAGNOSTICS | Age: 80
End: 2020-04-29
Payer: MEDICARE

## 2020-04-29 PROCEDURE — 93295 DEV INTERROG REMOTE 1/2/MLT: CPT | Performed by: INTERNAL MEDICINE

## 2020-04-29 PROCEDURE — 93296 REM INTERROG EVL PM/IDS: CPT | Performed by: INTERNAL MEDICINE

## 2020-05-01 ENCOUNTER — TELEPHONE (OUTPATIENT)
Dept: NON INVASIVE DIAGNOSTICS | Age: 80
End: 2020-05-01

## 2020-05-28 RX ORDER — DOFETILIDE 0.25 MG/1
CAPSULE ORAL
Qty: 180 CAPSULE | Refills: 3 | Status: SHIPPED | OUTPATIENT
Start: 2020-05-28

## 2020-07-29 ENCOUNTER — NURSE ONLY (OUTPATIENT)
Dept: NON INVASIVE DIAGNOSTICS | Age: 80
End: 2020-07-29
Payer: MEDICARE

## 2020-07-29 PROCEDURE — 93296 REM INTERROG EVL PM/IDS: CPT | Performed by: INTERNAL MEDICINE

## 2020-07-29 PROCEDURE — 93295 DEV INTERROG REMOTE 1/2/MLT: CPT | Performed by: INTERNAL MEDICINE

## 2020-08-10 NOTE — PROGRESS NOTES
See [employer] [place] report. Remote monitoring reviewed over a 90 day period. End of 90 day monitoring period date of service 7.29.2020.

## 2020-08-25 ENCOUNTER — HOSPITAL ENCOUNTER (OUTPATIENT)
Age: 80
Discharge: HOME | End: 2020-08-27
Payer: MEDICARE

## 2020-08-25 LAB
BASOPHILS ABSOLUTE: 0.04 E9/L (ref 0–0.2)
BASOPHILS RELATIVE PERCENT: 0.6 % (ref 0–2)
EOSINOPHILS ABSOLUTE: 0.21 E9/L (ref 0.05–0.5)
EOSINOPHILS RELATIVE PERCENT: 3.2 % (ref 0–6)
HCT VFR BLD CALC: 39.3 % (ref 37–54)
HEMOGLOBIN: 12.5 G/DL (ref 12.5–16.5)
IMMATURE GRANULOCYTES #: 0.04 E9/L
IMMATURE GRANULOCYTES %: 0.6 % (ref 0–5)
LYMPHOCYTES ABSOLUTE: 1.2 E9/L (ref 1.5–4)
LYMPHOCYTES RELATIVE PERCENT: 18.4 % (ref 20–42)
MCH RBC QN AUTO: 31.1 PG (ref 26–35)
MCHC RBC AUTO-ENTMCNC: 31.8 % (ref 32–34.5)
MCV RBC AUTO: 97.8 FL (ref 80–99.9)
MONOCYTES ABSOLUTE: 0.7 E9/L (ref 0.1–0.95)
MONOCYTES RELATIVE PERCENT: 10.7 % (ref 2–12)
NEUTROPHILS ABSOLUTE: 4.34 E9/L (ref 1.8–7.3)
NEUTROPHILS RELATIVE PERCENT: 66.5 % (ref 43–80)
PDW BLD-RTO: 14.5 FL (ref 11.5–15)
PLATELET # BLD: 255 E9/L (ref 130–450)
PMV BLD AUTO: 10.6 FL (ref 7–12)
RBC # BLD: 4.02 E12/L (ref 3.8–5.8)
WBC # BLD: 6.5 E9/L (ref 4.5–11.5)

## 2020-08-25 PROCEDURE — 84443 ASSAY THYROID STIM HORMONE: CPT

## 2020-08-25 PROCEDURE — 80053 COMPREHEN METABOLIC PANEL: CPT

## 2020-08-25 PROCEDURE — 83036 HEMOGLOBIN GLYCOSYLATED A1C: CPT

## 2020-08-25 PROCEDURE — 85025 COMPLETE CBC W/AUTO DIFF WBC: CPT

## 2020-08-25 PROCEDURE — 82044 UR ALBUMIN SEMIQUANTITATIVE: CPT

## 2020-08-25 PROCEDURE — 80061 LIPID PANEL: CPT

## 2020-08-26 LAB
ALBUMIN SERPL-MCNC: 3.9 G/DL (ref 3.5–5.2)
ALP BLD-CCNC: 24 U/L (ref 40–129)
ALT SERPL-CCNC: 11 U/L (ref 0–40)
ANION GAP SERPL CALCULATED.3IONS-SCNC: 17 MMOL/L (ref 7–16)
AST SERPL-CCNC: 23 U/L (ref 0–39)
BILIRUB SERPL-MCNC: 0.5 MG/DL (ref 0–1.2)
BUN BLDV-MCNC: 24 MG/DL (ref 8–23)
CALCIUM SERPL-MCNC: 9.6 MG/DL (ref 8.6–10.2)
CHLORIDE BLD-SCNC: 107 MMOL/L (ref 98–107)
CHOLESTEROL, TOTAL: 275 MG/DL (ref 0–199)
CO2: 22 MMOL/L (ref 22–29)
CREAT SERPL-MCNC: 1.4 MG/DL (ref 0.7–1.2)
GFR AFRICAN AMERICAN: 59
GFR NON-AFRICAN AMERICAN: 49 ML/MIN/1.73
GLUCOSE BLD-MCNC: 101 MG/DL (ref 74–99)
HBA1C MFR BLD: 6.7 % (ref 4–5.6)
HDLC SERPL-MCNC: 28 MG/DL
LDL CHOLESTEROL CALCULATED: 169 MG/DL (ref 0–99)
MICROALBUMIN UR-MCNC: 42.3 MG/L
POTASSIUM SERPL-SCNC: 4.2 MMOL/L (ref 3.5–5)
SODIUM BLD-SCNC: 146 MMOL/L (ref 132–146)
TOTAL PROTEIN: 7.3 G/DL (ref 6.4–8.3)
TRIGL SERPL-MCNC: 389 MG/DL (ref 0–149)
TSH SERPL DL<=0.05 MIU/L-ACNC: 1.58 UIU/ML (ref 0.27–4.2)
VLDLC SERPL CALC-MCNC: 78 MG/DL

## 2020-10-01 ENCOUNTER — OFFICE VISIT (OUTPATIENT)
Dept: CARDIOLOGY CLINIC | Age: 80
End: 2020-10-01
Payer: MEDICARE

## 2020-10-01 VITALS
BODY MASS INDEX: 36.53 KG/M2 | HEART RATE: 64 BPM | SYSTOLIC BLOOD PRESSURE: 110 MMHG | HEIGHT: 68 IN | DIASTOLIC BLOOD PRESSURE: 58 MMHG | WEIGHT: 241 LBS

## 2020-10-01 PROCEDURE — 93000 ELECTROCARDIOGRAM COMPLETE: CPT | Performed by: INTERNAL MEDICINE

## 2020-10-01 PROCEDURE — 1123F ACP DISCUSS/DSCN MKR DOCD: CPT | Performed by: INTERNAL MEDICINE

## 2020-10-01 PROCEDURE — G8417 CALC BMI ABV UP PARAM F/U: HCPCS | Performed by: INTERNAL MEDICINE

## 2020-10-01 PROCEDURE — G8484 FLU IMMUNIZE NO ADMIN: HCPCS | Performed by: INTERNAL MEDICINE

## 2020-10-01 PROCEDURE — 4040F PNEUMOC VAC/ADMIN/RCVD: CPT | Performed by: INTERNAL MEDICINE

## 2020-10-01 PROCEDURE — 4004F PT TOBACCO SCREEN RCVD TLK: CPT | Performed by: INTERNAL MEDICINE

## 2020-10-01 PROCEDURE — G8427 DOCREV CUR MEDS BY ELIG CLIN: HCPCS | Performed by: INTERNAL MEDICINE

## 2020-10-01 PROCEDURE — 99213 OFFICE O/P EST LOW 20 MIN: CPT | Performed by: INTERNAL MEDICINE

## 2020-10-01 RX ORDER — SITAGLIPTIN 50 MG/1
50 TABLET, FILM COATED ORAL DAILY
COMMUNITY
Start: 2020-08-25

## 2020-10-01 RX ORDER — CARVEDILOL 6.25 MG/1
TABLET ORAL
COMMUNITY
Start: 2020-08-24

## 2020-10-01 RX ORDER — FENOFIBRATE 145 MG/1
145 TABLET, COATED ORAL DAILY
COMMUNITY
Start: 2020-08-24

## 2020-10-01 NOTE — PROGRESS NOTES
OFFICE VISIT        PRIMARY CARE PHYSICIAN:    Catherine Masterson MD       ALLERGIES / SENSITIVITIES:    No Known Allergies       REVIEWED MEDICATIONS:      Current Outpatient Medications:     fenofibrate (TRICOR) 145 MG tablet, Take 145 mg by mouth daily , Disp: , Rfl:     carvedilol (COREG) 6.25 MG tablet, , Disp: , Rfl:     JANUVIA 50 MG tablet, 50 mg daily , Disp: , Rfl:     dofetilide (TIKOSYN) 250 MCG capsule, TAKE 1 CAPSULE BY MOUTH EVERY TWELVE (12) HOURS (Patient taking differently: 250 mcg 2 times daily ), Disp: 180 capsule, Rfl: 3    metFORMIN (GLUCOPHAGE-XR) 500 MG extended release tablet, 750 mg 2 times daily , Disp: , Rfl: 4    oxybutynin (DITROPAN) 5 MG tablet, Take 10 mg by mouth every evening, Disp: , Rfl:     donepezil (ARICEPT) 10 MG tablet, Take 10 mg by mouth nightly, Disp: , Rfl:     XARELTO 20 MG TABS tablet, TAKE 1 TABLET BY MOUTH ONCE DAILY, Disp: 90 tablet, Rfl: 3    Multiple Vitamins-Minerals (PRESERVISION AREDS 2+MULTI VIT PO), Take 1 tablet by mouth daily, Disp: , Rfl:     glipiZIDE (GLUCOTROL) 10 MG ER tablet, Take 10 mg by mouth daily , Disp: , Rfl:     CRESTOR 10 MG tablet, Take 1 tablet by mouth daily. , Disp: 30 tablet, Rfl: 5    losartan (COZAAR) 25 MG tablet, Take 1 tablet by mouth daily. , Disp: 30 tablet, Rfl: 5    tamsulosin (FLOMAX) 0.4 MG capsule, Take 1 capsule by mouth daily. , Disp: 30 capsule, Rfl: 0    magnesium oxide (MAG-OX) 400 MG tablet, Take 400 mg by mouth daily. , Disp: , Rfl:     Cholecalciferol (VITAMIN D3) 2000 UNITS CAPS, Take 2,000 Units by mouth daily. , Disp: , Rfl:     furosemide (LASIX) 40 MG tablet, Take 40 mg by mouth daily , Disp: , Rfl:     spironolactone (ALDACTONE) 25 MG tablet, Take 12.5 mg by mouth daily 1/2 tab daily, Disp: , Rfl:       S: REASON FOR VISIT:    Coronary artery disease/ischemic cardiomyopathy with severe LV systolic dysfunction. Sam Fay is an 64-year-old male with cardiovascular history as described below.   He reports that he walks 1 mile a day. He denies chest pain or dyspnea with his daily activities. He denies orthopnea, PND's or lowe extremity swelling. He denies palpitations, dizziness, presyncope or syncope or discharges from his ICD. He was in the Emergency Room on 3/11/2020 with confusion and fatigue and was diagnosed with acute cystitis with hematuria. Reportedly, and per the Emergency Room records, and prior to his presentation to the Emergency Room, he was found outside unable to walk, covered in dry BM. He was not admitted and was discharged home reportedly in stable condition. He has had no such problems since. He reports that he is compliant with his medications. REVIEW OF SYSTEMS:    CONSTITUTIONAL: Denies fevers, chills, night sweats or fatigue. HEENT: Denies headaches. C/O ptosis affecting vision. Denies dysphagia, hoarseness, hemoptysis, hematemesis or epistaxis. ENDOCRINE: Denies polyphagia, polydipsia or polyuria. Denies heat or cold intolerance. MUSCULOSKELETAL: Denies any significant arthralgias or myalgias currently. SKIN: Denies any rashes, ulcers or itching. HEME/LYMPH: Denies any palpable lymph nodes. He denies bleeding or easy bruisability. HEART: As above. LUNGS: Denies any cough or sputum production. GI: Denies abdominal pain, nausea, vomiting, diarrhea, constipation, rectal bleeding or tarry stools. : Denies hematuria or dysuria. PSYCHIATRIC: Denies mood changes, anxiety or depression. NEUROLOGIC: Denies memory loss, motor weakness, numbness, tingling or tremors.         CARDIOVASCULAR HISTORY:   1. Hypertension. 2. Diabetes mellitus. 3. Morbid obesity. 4. Dyslipidemia. 5. Coronary artery disease/ischemic cardiomyopathy:  a. Status post large anterior wall MI on 12/02/03. b. 12/02/03 cardiac catheterization and angioplasty: Left main no disease. LAD occluded proximally. LCX 20% distal disease.  RCA dominant vessel with 40%-50% proximal disease and 20%-30% distal disease.  Normal left ventricular size with anterolateral, apical and inferior apical akinesis with EF equal 15%-20%. Elevated EDP. Successful stenting to the proximal LAD with restoration of RUSSELL III flow. ReoPro was used during intervention procedure. Closure of the right femoral artery access site with Perclose. c. Treadmill nuclear stress test done on 1/21/09 showed a large fixed apical/periapical defect representing a scar with reportedly small areas of partial thickness revere redistribution involving the septum and anterior wall with an EF of 35%. d. 2/11/09 cardiac catheterization and angioplasty: Left main 30% ostial narrowing. LAD occluded at the origin of the first diagonal branch. LCX no significant disease. RCA 40%-50% proximal vessel stenosis. Normal LVEDP. Successful balloon angioplasty with the deployment of two overlapping drug eluting stents to the proximal/mid LAD with very good results. Closure of the right femoral artery access site with AngioSeal device.    e. 9/11/2015: Gudelia Bodo nuclear stress test was an abnormal study showing a large transmural myocardial infarction involving the left anterior descending artery distribution and possibly the right coronary artery distribution with no evidence of residual stress induced ischemia with the left ventricle appearing severely dilated with mild additional increase in the left ventricular cavity size after stress and with the gated views showing a large area of apical akinesis involving the adjacent anterior wall, the adjacent septum, the adjacent inferior wall and the adjacent lateral wall with severe hypokinesis of the rest of the inferior wall with a calculated ejection fraction of 28%.   f. Lexiscan nuclear stress test done on 8/22/2017 was a markedly abnormal study showing an infarction involving the anterior wall, the left ventricular apex and the apical inferior wall with residual stress-induced ischemia involving the interventricular septum, the basal inferior wall and the basal inferior wall with the left ventricle appearing severely dilated with additional increase in left ventricular cavity size after stress and with gated views showing septal motion consistent with conduction abnormality and/or base rhythm with severe generalized hypokinesis with a calculated ejection fraction of 27%. When compared to the study from 9/2015, fixed defects involving the LAD distribution and possibly the right coronary artery distribution were reported on that study (as above) with no reported evidence of reversibility and with the calculated ejection fraction of 28%.    6. Near-syncopal episode in 7/08 thought to be secondary to heat: The patient had a UTI at that time. 7. 10/10/13: Echocardiogram done at [address] [place] [address] was read by Dr. Rakel Lau as showing a dilated left ventricle with severe generalized hypokinesis with an estimated ejection fraction of 20% with mildly dilated right  ventricle, mildly dilated atria, mild mitral regurgitation and mild tricuspid regurgitation. 8. Status post ICD implantation (for primary prevention) on 11/8/13.   9. Paroxysmal atrial fibrillation noted on ICD interrogation on 2/27/14:    a. 4/24/14: DC Cardioversion. b. 5/29/14: DC cardioversion. c. On Tikosyn therapy.   10.  Echocardiogram done on 6/21/2019 for reassessment of LV systolic function showed a severe dilated left ventricle with filling and akinesis of the apical wall, the anteroseptal wall and the anterior wall with an estimated ejection fraction of 30-35%.    11.  Generator replacement of a dual chamber implant cardioverter defibrillator, Σκαφίδια 233 (on 6/24/2019).    PAST MEDICAL HISTORY:   1.  As under cardiovascular history. 2.  Fractured left rib. 3.  UTI in 6/08. 4.  12/10: Hospitalized with dehydration, hypotension and acute renal failure secondary to urinary tract obstruction.    5.  4/11: Hospitalized with septic shock (fever and severe hypotension) secondary to prostatitis. 6.  11: Polysomnography: Severe obstructive sleep apnea: Patient prescribed C-PAP which he does not wear. 7.  : Hospitalized with obstructive jaundice with liver abscess and cholecystitis status post drainage of the liver abscess and cholecystectomy. 8.  Anemia. Status post transfusion in . Patient on iron supplement currently. 9.  S/P total left knee replacement on 2015. 10.  Bilateral cataract repair 2016. 11.  Urinary incontinence. 12.  Chronic kidney disease. 13. Acute cystitis with hematuria, 3/10/2020.       FAMILY HISTORY:   Mother  at age 80 \"old age\". Father  at age [de-identified] \"old age\". Three brothers  of MI. One sister  of cancer.      SOCIAL HISTORY:   The patient does not smoke and he does not drink alcohol.        O:  COMPLETE PHYSICAL EXAM:      BP (!) 110/58 (Site: Right Upper Arm, Position: Sitting, Cuff Size: Medium Adult)   Pulse 64   Ht 5' 8\" (1.727 m)   Wt 241 lb (109.3 kg)   BMI 36.64 kg/m²      General:           Well-developed, well-nourished, morbidly-obese, disheveled male in no acute distress. Head & Neck:  Atraumatic/normocephalic head. No JVD. No carotid bruits. Carotid upstrokes somewhat diminished. No thyromegaly. Sclerae not     icteric. No xanthelasmas. Mucous membranes moist.   Chest:              Symmetrical and nontender. ICD site well healed left upper chest  Lungs:             Clear bilaterally. Heart:              Normal S1, S2. No S3 or S4. No murmurs or rubs. Abdomen:        Soft, nontender without organomegaly or masses. Normal bowel sounds. No bruits. Extremities:     Trace edema. Distal pulses normal bilaterally. Skin:                Normal turgor. No rashes or ulcers noted. Neuro:             Oriented x 3. No motor or sensory deficit detected. REVIEW OF DIAGNOSTIC TESTS:    1.   Blood tests from 2020 reviewed:  CBC unremarkable, hemoglobin A1C 6.7,

## 2020-10-28 ENCOUNTER — NURSE ONLY (OUTPATIENT)
Dept: NON INVASIVE DIAGNOSTICS | Age: 80
End: 2020-10-28
Payer: MEDICARE

## 2020-10-28 PROCEDURE — 93296 REM INTERROG EVL PM/IDS: CPT | Performed by: INTERNAL MEDICINE

## 2020-10-28 PROCEDURE — 93295 DEV INTERROG REMOTE 1/2/MLT: CPT | Performed by: INTERNAL MEDICINE

## 2021-02-01 ENCOUNTER — TELEPHONE (OUTPATIENT)
Dept: NON INVASIVE DIAGNOSTICS | Age: 81
End: 2021-02-01

## 2021-06-23 ENCOUNTER — TELEPHONE (OUTPATIENT)
Dept: NON INVASIVE DIAGNOSTICS | Age: 81
End: 2021-06-23

## 2021-06-23 NOTE — TELEPHONE ENCOUNTER
Missed remote transmission 01/27/2021    Overdue OV w/ CK last seen 06/2019 in hospital    Mailed letter

## 2021-06-23 NOTE — LETTER
[facility]  [address] [id number]  Phone: [phone]  Fax: [phone]    Osmani Desai MD        June 23, 2021    Chacha Costa      Dear Edenilson Hardy: You were due to send us a remote transmission on 01/27/2021 and we have yet to receive it. You are also overdue here in the office.  Please send us a remote transmission and give us a call to schedule appointment with Dr Francisco J Bernal here in the office. [phone]    Sincerely,  [facility]

## 2023-02-28 LAB — URINE CULTURE: NO GROWTH

## 2023-03-05 PROCEDURE — 99308 SBSQ NF CARE LOW MDM 20: CPT | Performed by: INTERNAL MEDICINE

## 2023-03-09 PROCEDURE — 99223 1ST HOSP IP/OBS HIGH 75: CPT | Performed by: INTERNAL MEDICINE

## 2023-03-10 PROCEDURE — 99232 SBSQ HOSP IP/OBS MODERATE 35: CPT | Performed by: INTERNAL MEDICINE

## 2023-03-11 PROCEDURE — 99232 SBSQ HOSP IP/OBS MODERATE 35: CPT | Performed by: INTERNAL MEDICINE

## 2023-03-13 PROCEDURE — 99232 SBSQ HOSP IP/OBS MODERATE 35: CPT | Performed by: INTERNAL MEDICINE

## 2023-03-14 PROCEDURE — 99232 SBSQ HOSP IP/OBS MODERATE 35: CPT | Performed by: INTERNAL MEDICINE

## 2023-03-15 PROCEDURE — 99232 SBSQ HOSP IP/OBS MODERATE 35: CPT | Performed by: INTERNAL MEDICINE

## 2023-03-16 PROCEDURE — 99232 SBSQ HOSP IP/OBS MODERATE 35: CPT | Performed by: INTERNAL MEDICINE

## 2023-03-17 PROCEDURE — 99232 SBSQ HOSP IP/OBS MODERATE 35: CPT | Performed by: INTERNAL MEDICINE

## 2023-09-05 PROBLEM — D63.8 ANEMIA OF CHRONIC ILLNESS: Status: ACTIVE | Noted: 2023-09-05

## 2023-09-05 PROBLEM — E11.40 DIABETIC NEUROPATHY (MULTI): Status: ACTIVE | Noted: 2023-09-05

## 2023-09-05 PROBLEM — D50.9 IRON DEFICIENCY ANEMIA: Status: ACTIVE | Noted: 2023-09-05

## 2023-09-05 PROBLEM — F41.8 MIXED ANXIETY DEPRESSIVE DISORDER: Status: ACTIVE | Noted: 2023-09-05

## 2023-09-05 PROBLEM — J18.9 PNEUMONIA: Status: ACTIVE | Noted: 2023-09-05

## 2023-09-05 PROBLEM — H02.402 PTOSIS OF LEFT EYELID: Status: ACTIVE | Noted: 2023-09-05

## 2023-09-05 PROBLEM — I25.5 ISCHEMIC CARDIOMYOPATHY: Status: ACTIVE | Noted: 2023-09-05

## 2023-09-05 PROBLEM — I15.9 SECONDARY HYPERTENSION: Status: ACTIVE | Noted: 2023-09-05

## 2023-09-05 PROBLEM — F02.80 ALZHEIMER'S DISEASE WITH LATE ONSET (MULTI): Status: ACTIVE | Noted: 2023-09-05

## 2023-09-05 PROBLEM — E66.9 DIABETES MELLITUS TYPE 2 IN OBESE: Status: ACTIVE | Noted: 2023-09-05

## 2023-09-05 PROBLEM — G92.8 TOXIC METABOLIC ENCEPHALOPATHY: Status: ACTIVE | Noted: 2023-09-05

## 2023-09-05 PROBLEM — G30.1 ALZHEIMER'S DISEASE WITH LATE ONSET (MULTI): Status: ACTIVE | Noted: 2023-09-05

## 2023-09-05 PROBLEM — I25.9 CHRONIC ISCHEMIC HEART DISEASE: Status: ACTIVE | Noted: 2023-09-05

## 2023-09-05 PROBLEM — K21.9 GASTRO-ESOPHAGEAL REFLUX DISEASE WITHOUT ESOPHAGITIS: Status: ACTIVE | Noted: 2023-09-05

## 2023-09-05 PROBLEM — G47.33 OSA (OBSTRUCTIVE SLEEP APNEA): Status: ACTIVE | Noted: 2023-09-05

## 2023-09-05 PROBLEM — Z95.810 IMPLANTABLE CARDIOVERTER-DEFIBRILLATOR (ICD) IN SITU: Status: ACTIVE | Noted: 2023-09-05

## 2023-09-05 PROBLEM — Z91.89 AT RISK FOR INJURY RELATED TO RESTRAINTS: Status: ACTIVE | Noted: 2023-09-05

## 2023-09-05 PROBLEM — E78.5 HYPERLIPIDEMIA: Status: ACTIVE | Noted: 2023-09-05

## 2023-09-05 PROBLEM — E11.69 DIABETES MELLITUS TYPE 2 IN OBESE: Status: ACTIVE | Noted: 2023-09-05

## 2023-09-05 PROBLEM — N18.32 CKD STAGE G3B/A1, GFR 30-44 AND ALBUMIN CREATININE RATIO <30 MG/G (MULTI): Status: ACTIVE | Noted: 2023-09-05

## 2023-09-05 PROBLEM — F05 SUNDOWN SYNDROME: Status: ACTIVE | Noted: 2023-09-05

## 2023-09-05 PROBLEM — I10 ESSENTIAL HYPERTENSION: Status: ACTIVE | Noted: 2023-09-05

## 2023-09-05 PROBLEM — N40.0 BENIGN PROSTATIC HYPERPLASIA: Status: ACTIVE | Noted: 2023-09-05

## 2023-09-05 PROBLEM — F03.90 DEMENTIA (MULTI): Status: ACTIVE | Noted: 2023-09-05

## 2023-09-05 PROBLEM — R41.82 ALTERED MENTAL STATUS: Status: ACTIVE | Noted: 2023-09-05

## 2023-09-05 PROBLEM — I50.9 HEART FAILURE (MULTI): Status: ACTIVE | Noted: 2023-09-05

## 2023-09-05 PROBLEM — R62.7 ADULT FAILURE TO THRIVE SYNDROME: Status: ACTIVE | Noted: 2023-09-05

## 2023-09-05 PROBLEM — R31.0 GROSS HEMATURIA: Status: ACTIVE | Noted: 2023-09-05

## 2023-09-05 PROBLEM — F41.1 GAD (GENERALIZED ANXIETY DISORDER): Status: ACTIVE | Noted: 2023-09-05

## 2023-09-05 PROBLEM — R33.9 URINARY RETENTION: Status: ACTIVE | Noted: 2023-09-05

## 2023-09-05 PROBLEM — M19.91 PRIMARY OSTEOARTHRITIS: Status: ACTIVE | Noted: 2023-09-05

## 2023-09-05 PROBLEM — R45.1 AGITATION: Status: ACTIVE | Noted: 2023-09-05

## 2023-09-05 PROBLEM — Z72.0 TOBACCO ABUSE: Status: ACTIVE | Noted: 2023-09-05

## 2023-09-05 PROBLEM — N32.3 DIVERTICULUM OF BLADDER: Status: ACTIVE | Noted: 2023-09-05

## 2023-09-05 PROBLEM — R82.81 PYURIA: Status: ACTIVE | Noted: 2023-09-05

## 2023-09-05 PROBLEM — E66.01 MORBID OBESITY (MULTI): Status: ACTIVE | Noted: 2023-09-05

## 2023-09-05 PROBLEM — I25.10 ARTERIOSCLEROSIS OF CORONARY ARTERY: Status: ACTIVE | Noted: 2023-09-05

## 2023-09-05 PROBLEM — A49.8 CLOSTRIDIUM DIFFICILE INFECTION: Status: ACTIVE | Noted: 2023-09-05

## 2023-09-05 PROBLEM — I50.42 CHRONIC COMBINED SYSTOLIC AND DIASTOLIC CHF (CONGESTIVE HEART FAILURE) (MULTI): Status: ACTIVE | Noted: 2023-09-05

## 2023-09-05 PROBLEM — I48.91 ATRIAL FIBRILLATION (MULTI): Status: ACTIVE | Noted: 2023-09-05

## 2023-09-05 PROBLEM — M19.90 DEGENERATIVE JOINT DISEASE: Status: ACTIVE | Noted: 2023-09-05

## 2023-09-05 PROBLEM — G47.00 INSOMNIA: Status: ACTIVE | Noted: 2023-09-05

## 2023-09-05 PROBLEM — N32.81 OVERACTIVE BLADDER: Status: ACTIVE | Noted: 2023-09-05

## 2023-09-05 PROBLEM — R40.1 CLOUDED CONSCIOUSNESS: Status: ACTIVE | Noted: 2023-09-05

## 2023-09-05 RX ORDER — GUAIFENESIN 400 MG/1
400 TABLET ORAL EVERY 4 HOURS PRN
COMMUNITY

## 2023-09-05 RX ORDER — CARVEDILOL 6.25 MG/1
6.25 TABLET ORAL 2 TIMES DAILY
COMMUNITY

## 2023-09-05 RX ORDER — CALCIUM CARBONATE/VITAMIN D3 500-10/5ML
1 LIQUID (ML) ORAL DAILY
COMMUNITY
Start: 2014-08-05

## 2023-09-05 RX ORDER — GUAIFENESIN 1200 MG
650 TABLET, EXTENDED RELEASE 12 HR ORAL EVERY 4 HOURS PRN
COMMUNITY

## 2023-09-05 RX ORDER — MELATONIN 5 MG
1 CAPSULE ORAL NIGHTLY PRN
COMMUNITY

## 2023-09-05 RX ORDER — FUROSEMIDE 40 MG/1
20 TABLET ORAL DAILY
COMMUNITY

## 2023-09-05 RX ORDER — DOFETILIDE 0.25 MG/1
1 CAPSULE ORAL 2 TIMES DAILY
COMMUNITY
Start: 2014-06-02

## 2023-09-05 RX ORDER — METFORMIN HYDROCHLORIDE 500 MG/1
500 TABLET, EXTENDED RELEASE ORAL 2 TIMES DAILY
COMMUNITY
Start: 2021-08-16

## 2023-09-05 RX ORDER — INSULIN GLARGINE 100 [IU]/ML
25 INJECTION, SOLUTION SUBCUTANEOUS NIGHTLY
COMMUNITY
Start: 2021-09-14

## 2023-09-05 RX ORDER — CARVEDILOL 12.5 MG/1
1 TABLET ORAL
COMMUNITY

## 2023-09-05 RX ORDER — ARTIFICIAL TEARS 1; 2; 3 MG/ML; MG/ML; MG/ML
1 SOLUTION/ DROPS OPHTHALMIC 4 TIMES DAILY PRN
COMMUNITY

## 2023-09-05 RX ORDER — TAMSULOSIN HYDROCHLORIDE 0.4 MG/1
1 CAPSULE ORAL DAILY
COMMUNITY

## 2023-09-05 RX ORDER — MIRABEGRON 25 MG/1
1 TABLET, FILM COATED, EXTENDED RELEASE ORAL DAILY
COMMUNITY

## 2023-09-05 RX ORDER — INSULIN LISPRO 100 [IU]/ML
INJECTION, SOLUTION INTRAVENOUS; SUBCUTANEOUS
COMMUNITY
Start: 2021-09-20

## 2023-09-05 RX ORDER — LIDOCAINE HYDROCHLORIDE 20 MG/ML
5 JELLY TOPICAL EVERY 4 HOURS PRN
COMMUNITY
Start: 2023-02-22

## 2023-09-05 RX ORDER — GLIPIZIDE 10 MG/1
TABLET, FILM COATED, EXTENDED RELEASE ORAL
COMMUNITY
Start: 2014-03-25

## 2023-09-05 RX ORDER — LINAGLIPTIN 5 MG/1
5 TABLET, FILM COATED ORAL DAILY
COMMUNITY

## 2023-09-05 RX ORDER — NITROFURANTOIN 25; 75 MG/1; MG/1
100 CAPSULE ORAL EVERY 12 HOURS
COMMUNITY
Start: 2023-02-27

## 2023-09-05 RX ORDER — FERROUS SULFATE 325(65) MG
TABLET ORAL
COMMUNITY

## 2023-09-05 RX ORDER — LOPERAMIDE HYDROCHLORIDE 2 MG/1
2 CAPSULE ORAL EVERY 8 HOURS PRN
COMMUNITY

## 2023-09-05 RX ORDER — FENOFIBRATE 145 MG/1
145 TABLET, FILM COATED ORAL DAILY
COMMUNITY

## 2023-09-05 RX ORDER — MIRTAZAPINE 7.5 MG/1
7.5 TABLET, FILM COATED ORAL NIGHTLY
COMMUNITY
Start: 2021-05-06

## 2023-09-05 RX ORDER — LOSARTAN POTASSIUM 25 MG/1
1 TABLET ORAL DAILY
COMMUNITY

## 2023-09-05 RX ORDER — CELECOXIB 200 MG/1
CAPSULE ORAL
COMMUNITY
Start: 2014-08-02

## 2023-09-05 RX ORDER — VITS A,C,E/LUTEIN/MINERALS 300MCG-200
1 TABLET ORAL DAILY
COMMUNITY

## 2023-09-05 RX ORDER — FUROSEMIDE 40 MG/1
40 TABLET ORAL DAILY
COMMUNITY

## 2023-09-05 RX ORDER — NYSTATIN 100000 [USP'U]/G
POWDER TOPICAL 2 TIMES DAILY
COMMUNITY

## 2023-09-05 RX ORDER — NAPROXEN SODIUM 220 MG/1
81 TABLET, FILM COATED ORAL DAILY
COMMUNITY

## 2023-09-05 RX ORDER — HYDROXYZINE HYDROCHLORIDE 25 MG/1
25 TABLET, FILM COATED ORAL 4 TIMES DAILY PRN
COMMUNITY

## 2023-09-05 RX ORDER — METOPROLOL SUCCINATE 50 MG/1
50 TABLET, EXTENDED RELEASE ORAL 2 TIMES DAILY
COMMUNITY

## 2023-09-05 RX ORDER — DONEPEZIL HYDROCHLORIDE 10 MG/1
10 TABLET, FILM COATED ORAL NIGHTLY
COMMUNITY

## 2023-09-05 RX ORDER — SPIRONOLACTONE 50 MG/1
25 TABLET, FILM COATED ORAL DAILY
COMMUNITY

## 2023-09-05 RX ORDER — ROSUVASTATIN CALCIUM 10 MG/1
10 TABLET, COATED ORAL DAILY
COMMUNITY